# Patient Record
Sex: MALE | Race: BLACK OR AFRICAN AMERICAN | NOT HISPANIC OR LATINO | URBAN - METROPOLITAN AREA
[De-identification: names, ages, dates, MRNs, and addresses within clinical notes are randomized per-mention and may not be internally consistent; named-entity substitution may affect disease eponyms.]

---

## 2020-04-06 ENCOUNTER — INPATIENT (INPATIENT)
Facility: HOSPITAL | Age: 45
LOS: 7 days | Discharge: HOME | End: 2020-04-14
Attending: INTERNAL MEDICINE
Payer: COMMERCIAL

## 2020-04-06 VITALS
DIASTOLIC BLOOD PRESSURE: 92 MMHG | OXYGEN SATURATION: 93 % | TEMPERATURE: 100 F | RESPIRATION RATE: 24 BRPM | HEART RATE: 110 BPM | SYSTOLIC BLOOD PRESSURE: 134 MMHG

## 2020-04-06 DIAGNOSIS — B97.21 SARS-ASSOCIATED CORONAVIRUS AS THE CAUSE OF DISEASES CLASSIFIED ELSEWHERE: ICD-10-CM

## 2020-04-06 DIAGNOSIS — Z29.9 ENCOUNTER FOR PROPHYLACTIC MEASURES, UNSPECIFIED: ICD-10-CM

## 2020-04-06 DIAGNOSIS — R09.02 HYPOXEMIA: ICD-10-CM

## 2020-04-06 DIAGNOSIS — I10 ESSENTIAL (PRIMARY) HYPERTENSION: ICD-10-CM

## 2020-04-06 DIAGNOSIS — E87.6 HYPOKALEMIA: ICD-10-CM

## 2020-04-06 LAB
ALBUMIN SERPL ELPH-MCNC: 3.6 G/DL — SIGNIFICANT CHANGE UP (ref 3.3–5)
ALP SERPL-CCNC: 53 U/L — SIGNIFICANT CHANGE UP (ref 40–120)
ALT FLD-CCNC: 53 U/L — HIGH (ref 4–41)
ANION GAP SERPL CALC-SCNC: 14 MMO/L — SIGNIFICANT CHANGE UP (ref 7–14)
APTT BLD: 30.9 SEC — SIGNIFICANT CHANGE UP (ref 27.5–36.3)
AST SERPL-CCNC: 78 U/L — HIGH (ref 4–40)
BASE EXCESS BLDV CALC-SCNC: 3.1 MMOL/L — SIGNIFICANT CHANGE UP
BASOPHILS # BLD AUTO: 0.02 K/UL — SIGNIFICANT CHANGE UP (ref 0–0.2)
BASOPHILS NFR BLD AUTO: 0.3 % — SIGNIFICANT CHANGE UP (ref 0–2)
BILIRUB SERPL-MCNC: 0.5 MG/DL — SIGNIFICANT CHANGE UP (ref 0.2–1.2)
BLOOD GAS VENOUS - CREATININE: 1.29 MG/DL — SIGNIFICANT CHANGE UP (ref 0.5–1.3)
BLOOD GAS VENOUS - FIO2: 100 — SIGNIFICANT CHANGE UP
BUN SERPL-MCNC: 13 MG/DL — SIGNIFICANT CHANGE UP (ref 7–23)
CALCIUM SERPL-MCNC: 8.8 MG/DL — SIGNIFICANT CHANGE UP (ref 8.4–10.5)
CHLORIDE BLDV-SCNC: 102 MMOL/L — SIGNIFICANT CHANGE UP (ref 96–108)
CHLORIDE SERPL-SCNC: 96 MMOL/L — LOW (ref 98–107)
CO2 SERPL-SCNC: 24 MMOL/L — SIGNIFICANT CHANGE UP (ref 22–31)
CREAT SERPL-MCNC: 1.3 MG/DL — SIGNIFICANT CHANGE UP (ref 0.5–1.3)
CRP SERPL-MCNC: 70.5 MG/L — HIGH
D DIMER BLD IA.RAPID-MCNC: 492 NG/ML — SIGNIFICANT CHANGE UP
EOSINOPHIL # BLD AUTO: 0 K/UL — SIGNIFICANT CHANGE UP (ref 0–0.5)
EOSINOPHIL NFR BLD AUTO: 0 % — SIGNIFICANT CHANGE UP (ref 0–6)
FERRITIN SERPL-MCNC: 510.4 NG/ML — HIGH (ref 30–400)
GAS PNL BLDV: 132 MMOL/L — LOW (ref 136–146)
GLUCOSE BLDV-MCNC: 126 MG/DL — HIGH (ref 70–99)
GLUCOSE SERPL-MCNC: 127 MG/DL — HIGH (ref 70–99)
HCO3 BLDV-SCNC: 27 MMOL/L — SIGNIFICANT CHANGE UP (ref 20–27)
HCT VFR BLD CALC: 39.5 % — SIGNIFICANT CHANGE UP (ref 39–50)
HCT VFR BLDV CALC: 43 % — SIGNIFICANT CHANGE UP (ref 39–51)
HGB BLD-MCNC: 13.4 G/DL — SIGNIFICANT CHANGE UP (ref 13–17)
HGB BLDV-MCNC: 14 G/DL — SIGNIFICANT CHANGE UP (ref 13–17)
IMM GRANULOCYTES NFR BLD AUTO: 0.6 % — SIGNIFICANT CHANGE UP (ref 0–1.5)
INR BLD: 1.29 — HIGH (ref 0.88–1.17)
LACTATE BLDV-MCNC: 1.2 MMOL/L — SIGNIFICANT CHANGE UP (ref 0.5–2)
LDH SERPL L TO P-CCNC: 542 U/L — HIGH (ref 135–225)
LYMPHOCYTES # BLD AUTO: 0.91 K/UL — LOW (ref 1–3.3)
LYMPHOCYTES # BLD AUTO: 11.6 % — LOW (ref 13–44)
MCHC RBC-ENTMCNC: 29.3 PG — SIGNIFICANT CHANGE UP (ref 27–34)
MCHC RBC-ENTMCNC: 33.9 % — SIGNIFICANT CHANGE UP (ref 32–36)
MCV RBC AUTO: 86.2 FL — SIGNIFICANT CHANGE UP (ref 80–100)
MONOCYTES # BLD AUTO: 0.44 K/UL — SIGNIFICANT CHANGE UP (ref 0–0.9)
MONOCYTES NFR BLD AUTO: 5.6 % — SIGNIFICANT CHANGE UP (ref 2–14)
NEUTROPHILS # BLD AUTO: 6.43 K/UL — SIGNIFICANT CHANGE UP (ref 1.8–7.4)
NEUTROPHILS NFR BLD AUTO: 81.9 % — HIGH (ref 43–77)
NRBC # FLD: 0 K/UL — SIGNIFICANT CHANGE UP (ref 0–0)
PCO2 BLDV: 39 MMHG — LOW (ref 41–51)
PH BLDV: 7.46 PH — HIGH (ref 7.32–7.43)
PLATELET # BLD AUTO: 257 K/UL — SIGNIFICANT CHANGE UP (ref 150–400)
PMV BLD: 9.1 FL — SIGNIFICANT CHANGE UP (ref 7–13)
PO2 BLDV: 50 MMHG — HIGH (ref 35–40)
POTASSIUM BLDV-SCNC: 2.9 MMOL/L — CRITICAL LOW (ref 3.4–4.5)
POTASSIUM SERPL-MCNC: 3.1 MMOL/L — LOW (ref 3.5–5.3)
POTASSIUM SERPL-SCNC: 3.1 MMOL/L — LOW (ref 3.5–5.3)
PROCALCITONIN SERPL-MCNC: 0.09 NG/ML — SIGNIFICANT CHANGE UP (ref 0.02–0.1)
PROT SERPL-MCNC: 7.2 G/DL — SIGNIFICANT CHANGE UP (ref 6–8.3)
PROTHROM AB SERPL-ACNC: 15 SEC — HIGH (ref 9.8–13.1)
RBC # BLD: 4.58 M/UL — SIGNIFICANT CHANGE UP (ref 4.2–5.8)
RBC # FLD: 13.3 % — SIGNIFICANT CHANGE UP (ref 10.3–14.5)
SAO2 % BLDV: 84.2 % — SIGNIFICANT CHANGE UP (ref 60–85)
SODIUM SERPL-SCNC: 134 MMOL/L — LOW (ref 135–145)
WBC # BLD: 7.85 K/UL — SIGNIFICANT CHANGE UP (ref 3.8–10.5)
WBC # FLD AUTO: 7.85 K/UL — SIGNIFICANT CHANGE UP (ref 3.8–10.5)

## 2020-04-06 PROCEDURE — 71045 X-RAY EXAM CHEST 1 VIEW: CPT | Mod: 26

## 2020-04-06 PROCEDURE — 99233 SBSQ HOSP IP/OBS HIGH 50: CPT

## 2020-04-06 RX ORDER — ACETAMINOPHEN 500 MG
650 TABLET ORAL EVERY 4 HOURS
Refills: 0 | Status: DISCONTINUED | OUTPATIENT
Start: 2020-04-06 | End: 2020-04-14

## 2020-04-06 RX ORDER — HYDROXYCHLOROQUINE SULFATE 200 MG
TABLET ORAL
Refills: 0 | Status: COMPLETED | OUTPATIENT
Start: 2020-04-06 | End: 2020-04-11

## 2020-04-06 RX ORDER — AMLODIPINE BESYLATE 2.5 MG/1
1 TABLET ORAL
Qty: 0 | Refills: 0 | DISCHARGE

## 2020-04-06 RX ORDER — HYDROXYCHLOROQUINE SULFATE 200 MG
200 TABLET ORAL EVERY 12 HOURS
Refills: 0 | Status: COMPLETED | OUTPATIENT
Start: 2020-04-07 | End: 2020-04-11

## 2020-04-06 RX ORDER — POTASSIUM CHLORIDE 20 MEQ
40 PACKET (EA) ORAL EVERY 4 HOURS
Refills: 0 | Status: COMPLETED | OUTPATIENT
Start: 2020-04-06 | End: 2020-04-07

## 2020-04-06 RX ORDER — POTASSIUM CHLORIDE 20 MEQ
10 PACKET (EA) ORAL
Refills: 0 | Status: COMPLETED | OUTPATIENT
Start: 2020-04-06 | End: 2020-04-06

## 2020-04-06 RX ORDER — HYDROXYCHLOROQUINE SULFATE 200 MG
400 TABLET ORAL EVERY 12 HOURS
Refills: 0 | Status: COMPLETED | OUTPATIENT
Start: 2020-04-06 | End: 2020-04-07

## 2020-04-06 RX ORDER — ENOXAPARIN SODIUM 100 MG/ML
40 INJECTION SUBCUTANEOUS DAILY
Refills: 0 | Status: DISCONTINUED | OUTPATIENT
Start: 2020-04-06 | End: 2020-04-09

## 2020-04-06 RX ORDER — AMLODIPINE BESYLATE 2.5 MG/1
10 TABLET ORAL DAILY
Refills: 0 | Status: DISCONTINUED | OUTPATIENT
Start: 2020-04-07 | End: 2020-04-14

## 2020-04-06 RX ADMIN — Medication 100 MILLIEQUIVALENT(S): at 17:30

## 2020-04-06 RX ADMIN — Medication 100 MILLIEQUIVALENT(S): at 19:22

## 2020-04-06 RX ADMIN — Medication 40 MILLIGRAM(S): at 17:28

## 2020-04-06 RX ADMIN — Medication 400 MILLIGRAM(S): at 21:30

## 2020-04-06 RX ADMIN — ENOXAPARIN SODIUM 40 MILLIGRAM(S): 100 INJECTION SUBCUTANEOUS at 17:28

## 2020-04-06 RX ADMIN — Medication 40 MILLIEQUIVALENT(S): at 17:28

## 2020-04-06 RX ADMIN — Medication 650 MILLIGRAM(S): at 17:30

## 2020-04-06 RX ADMIN — Medication 40 MILLIEQUIVALENT(S): at 21:30

## 2020-04-06 NOTE — H&P ADULT - HISTORY OF PRESENT ILLNESS
Per current hospital medicine emergency protocol, in an effort to reduce COVID exposures and also conserve PPE for necessary encounters, H&P below is obtained from chart review without direct patient contact unless acute changes develop.    HPI:      PAST MEDICAL & SURGICAL HISTORY:      Review of Systems:   CONSTITUTIONAL: No fever, weight loss, or fatigue  EYES: No eye pain, visual disturbances, or discharge  ENMT:  No difficulty hearing, tinnitus, vertigo; No sinus or throat pain  NECK: No pain or stiffness  BREASTS: No pain, masses, or nipple discharge  RESPIRATORY: No cough, wheezing, chills or hemoptysis; No shortness of breath  CARDIOVASCULAR: No chest pain, palpitations, dizziness, or leg swelling  GASTROINTESTINAL: No abdominal or epigastric pain. No nausea, vomiting, or hematemesis; No diarrhea or constipation. No melena or hematochezia.  GENITOURINARY: No dysuria, frequency, hematuria, or incontinence  NEUROLOGICAL: No headaches, memory loss, loss of strength, numbness, or tremors  SKIN: No itching, burning, rashes, or lesions   LYMPH NODES: No enlarged glands  ENDOCRINE: No heat or cold intolerance; No hair loss  MUSCULOSKELETAL: No joint pain or swelling; No muscle, back, or extremity pain  PSYCHIATRIC: No depression, anxiety, mood swings, or difficulty sleeping  HEME/LYMPH: No easy bruising, or bleeding gums  ALLERGY AND IMMUNOLOGIC: No hives or eczema    Allergies    No Known Allergies    Intolerances        Social History:     FAMILY HISTORY:      MEDICATIONS  (STANDING):  enoxaparin Injectable 40 milliGRAM(s) SubCutaneous daily  methylPREDNISolone sodium succinate Injectable 40 milliGRAM(s) IV Push two times a day  potassium chloride    Tablet ER 40 milliEquivalent(s) Oral every 4 hours  potassium chloride  10 mEq/100 mL IVPB 10 milliEquivalent(s) IV Intermittent every 1 hour    MEDICATIONS  (PRN):  acetaminophen   Tablet .. 650 milliGRAM(s) Oral every 4 hours PRN Temp greater or equal to 38.5C (101.3F)  acetaminophen  Suppository .. 650 milliGRAM(s) Rectal every 4 hours PRN Temp greater or equal to 38.5C (101.3F)      T(C): 37.8 (04-06-20 @ 16:41), Max: 37.8 (04-06-20 @ 12:24)  HR: 100 (04-06-20 @ 16:41) (100 - 110)  BP: 168/92 (04-06-20 @ 16:41) (112/86 - 168/92)  RR: 32 (04-06-20 @ 16:42) (24 - 45)  SpO2: 92% (04-06-20 @ 16:42) (75% - 93%)    CAPILLARY BLOOD GLUCOSE        I&O's Summary      PHYSICAL EXAM:  GENERAL: NAD, well-developed  HEAD:  Atraumatic, Normocephalic  EYES: EOMI, PERRLA, conjunctiva and sclera clear  NECK: Supple, No elevated JVD  CHEST/LUNG: Clear to auscultation bilaterally; No wheeze  HEART: Regular rate and rhythm; No murmurs, rubs, or gallops  ABDOMEN: Soft, Nontender, Nondistended; Bowel sounds present  EXTREMITIES:  2+ Peripheral Pulses, No clubbing, cyanosis, or edema  PSYCH: AAOx3  NEUROLOGY: CN II-XII grossly intact, moving all extremities  SKIN: No rashes or lesions    LABS:                        13.4   7.85  )-----------( 257      ( 06 Apr 2020 12:50 )             39.5     04-06    134<L>  |  96<L>  |  13  ----------------------------<  127<H>  3.1<L>   |  24  |  1.30    Ca    8.8      06 Apr 2020 12:50    TPro  7.2  /  Alb  3.6  /  TBili  0.5  /  DBili  x   /  AST  78<H>  /  ALT  53<H>  /  AlkPhos  53  04-06    PT/INR - ( 06 Apr 2020 12:50 )   PT: 15.0 SEC;   INR: 1.29          PTT - ( 06 Apr 2020 12:50 )  PTT:30.9 SEC            RADIOLOGY & ADDITIONAL TESTS:    ECG Personally Reviewed -     Imaging Personally Reviewed:    Consultant(s) Notes Reviewed:      Care Discussed with Consultants/Other Providers: Per current hospital medicine emergency protocol, in an effort to reduce COVID exposures and also conserve PPE for necessary encounters, H&P below is obtained from chart review without direct patient contact unless acute changes develop.    HPI:  44M PMH HTN (on Amlodipine) p/w weakness, intermittent fevers, night sweats since last Wednesday. States his weakness got worse last night and felt shortness of breath this morning and went to  and was told based on his oxygen saturation to come to the ED. Reports night sweats and fevers getting better in the last few days but his weakness persisting. Denies any headaches, cough, chest pain, abd pain, n/v/d, urinary sxs, leg swelling. Denies recent surgeries, hx blood clots. States he works at [employer]. Was given tylenol an hour prior to arrival at .      PAST MEDICAL & SURGICAL HISTORY:      Review of Systems: per ED        Allergies    No Known Allergies    Intolerances        Social History:     FAMILY HISTORY:      MEDICATIONS  (STANDING):  enoxaparin Injectable 40 milliGRAM(s) SubCutaneous daily  methylPREDNISolone sodium succinate Injectable 40 milliGRAM(s) IV Push two times a day  potassium chloride    Tablet ER 40 milliEquivalent(s) Oral every 4 hours  potassium chloride  10 mEq/100 mL IVPB 10 milliEquivalent(s) IV Intermittent every 1 hour    MEDICATIONS  (PRN):  acetaminophen   Tablet .. 650 milliGRAM(s) Oral every 4 hours PRN Temp greater or equal to 38.5C (101.3F)  acetaminophen  Suppository .. 650 milliGRAM(s) Rectal every 4 hours PRN Temp greater or equal to 38.5C (101.3F)      T(C): 37.8 (04-06-20 @ 16:41), Max: 37.8 (04-06-20 @ 12:24)  HR: 100 (04-06-20 @ 16:41) (100 - 110)  BP: 168/92 (04-06-20 @ 16:41) (112/86 - 168/92)  RR: 32 (04-06-20 @ 16:42) (24 - 45)  SpO2: 92% (04-06-20 @ 16:42) (75% - 93%)    CAPILLARY BLOOD GLUCOSE        I&O's Summary      PHYSICAL EXAM:  GENERAL: NAD, well-developed  HEAD:  Atraumatic, Normocephalic  EYES: EOMI, PERRLA, conjunctiva and sclera clear  NECK: Supple, No elevated JVD  CHEST/LUNG: Clear to auscultation bilaterally; No wheeze  HEART: Regular rate and rhythm; No murmurs, rubs, or gallops  ABDOMEN: Soft, Nontender, Nondistended; Bowel sounds present  EXTREMITIES:  2+ Peripheral Pulses, No clubbing, cyanosis, or edema  PSYCH: AAOx3  NEUROLOGY: CN II-XII grossly intact, moving all extremities  SKIN: No rashes or lesions    LABS:                        13.4   7.85  )-----------( 257      ( 06 Apr 2020 12:50 )             39.5     04-06    134<L>  |  96<L>  |  13  ----------------------------<  127<H>  3.1<L>   |  24  |  1.30    Ca    8.8      06 Apr 2020 12:50    TPro  7.2  /  Alb  3.6  /  TBili  0.5  /  DBili  x   /  AST  78<H>  /  ALT  53<H>  /  AlkPhos  53  04-06    PT/INR - ( 06 Apr 2020 12:50 )   PT: 15.0 SEC;   INR: 1.29          PTT - ( 06 Apr 2020 12:50 )  PTT:30.9 SEC            RADIOLOGY & ADDITIONAL TESTS:    ECG Personally Reviewed -     Imaging Personally Reviewed:    Consultant(s) Notes Reviewed:      Care Discussed with Consultants/Other Providers: Per current hospital medicine emergency protocol, in an effort to reduce COVID exposures and also conserve PPE for necessary encounters, H&P below is obtained from chart review without direct patient contact unless acute changes develop.    HPI:  44M PMH HTN (on Amlodipine) p/w weakness, intermittent fevers, night sweats since last Wednesday. States his weakness got worse last night and felt shortness of breath this morning and went to  and was told based on his oxygen saturation to come to the ED. Reports night sweats and fevers getting better in the last few days but his weakness persisting. Denies any headaches, cough, chest pain, abd pain, n/v/d, urinary sxs, leg swelling. Denies recent surgeries, hx blood clots. States he works at [employer]. Was given tylenol an hour prior to arrival at .      PAST MEDICAL & SURGICAL HISTORY:      Review of Systems: per ED      Allergies    No Known Allergies    Intolerances        Social History:     FAMILY HISTORY:      MEDICATIONS  (STANDING):  enoxaparin Injectable 40 milliGRAM(s) SubCutaneous daily  methylPREDNISolone sodium succinate Injectable 40 milliGRAM(s) IV Push two times a day  potassium chloride    Tablet ER 40 milliEquivalent(s) Oral every 4 hours  potassium chloride  10 mEq/100 mL IVPB 10 milliEquivalent(s) IV Intermittent every 1 hour    MEDICATIONS  (PRN):  acetaminophen   Tablet .. 650 milliGRAM(s) Oral every 4 hours PRN Temp greater or equal to 38.5C (101.3F)  acetaminophen  Suppository .. 650 milliGRAM(s) Rectal every 4 hours PRN Temp greater or equal to 38.5C (101.3F)      T(C): 37.8 (04-06-20 @ 16:41), Max: 37.8 (04-06-20 @ 12:24)  HR: 100 (04-06-20 @ 16:41) (100 - 110)  BP: 168/92 (04-06-20 @ 16:41) (112/86 - 168/92)  RR: 32 (04-06-20 @ 16:42) (24 - 45)  SpO2: 92% (04-06-20 @ 16:42) (75% - 93%)    CAPILLARY BLOOD GLUCOSE        I&O's Summary      PHYSICAL EXAM:  GENERAL: NAD, well-developed  HEAD:  Atraumatic, Normocephalic  EYES: EOMI, PERRLA, conjunctiva and sclera clear  NECK: Supple, No elevated JVD  CHEST/LUNG: Clear to auscultation bilaterally; No wheeze  HEART: Regular rate and rhythm; No murmurs, rubs, or gallops  ABDOMEN: Soft, Nontender, Nondistended; Bowel sounds present  EXTREMITIES:  2+ Peripheral Pulses, No clubbing, cyanosis, or edema  PSYCH: AAOx3  NEUROLOGY: CN II-XII grossly intact, moving all extremities  SKIN: No rashes or lesions    LABS:                        13.4   7.85  )-----------( 257      ( 06 Apr 2020 12:50 )             39.5     04-06    134<L>  |  96<L>  |  13  ----------------------------<  127<H>  3.1<L>   |  24  |  1.30    Ca    8.8      06 Apr 2020 12:50    TPro  7.2  /  Alb  3.6  /  TBili  0.5  /  DBili  x   /  AST  78<H>  /  ALT  53<H>  /  AlkPhos  53  04-06    PT/INR - ( 06 Apr 2020 12:50 )   PT: 15.0 SEC;   INR: 1.29          PTT - ( 06 Apr 2020 12:50 )  PTT:30.9 SEC            RADIOLOGY & ADDITIONAL TESTS:    ECG Personally Reviewed -     Imaging Personally Reviewed:    Consultant(s) Notes Reviewed:      Care Discussed with Consultants/Other Providers: Per current hospital medicine emergency protocol, in an effort to reduce COVID exposures and also conserve PPE for necessary encounters, H&P below is obtained from chart review without direct patient contact unless acute changes develop.    HPI:  44M PMH HTN (on Amlodipine) p/w weakness, intermittent fevers, night sweats since last Wednesday. States his weakness got worse last night and felt shortness of breath this morning and went to  and was told based on his oxygen saturation to come to the ED. Reports night sweats and fevers getting better in the last few days but his weakness persisting. Denies any headaches, cough, chest pain, abd pain, n/v/d, urinary sxs, leg swelling. Denies recent surgeries, hx blood clots. States he works at [employer]. Was given tylenol an hour prior to arrival at .      PAST MEDICAL & SURGICAL HISTORY:      Review of Systems: per ED  negative except as in HPI    Allergies    No Known Allergies    Intolerances        Social History:     FAMILY HISTORY:      MEDICATIONS  (STANDING):  enoxaparin Injectable 40 milliGRAM(s) SubCutaneous daily  methylPREDNISolone sodium succinate Injectable 40 milliGRAM(s) IV Push two times a day  potassium chloride    Tablet ER 40 milliEquivalent(s) Oral every 4 hours  potassium chloride  10 mEq/100 mL IVPB 10 milliEquivalent(s) IV Intermittent every 1 hour    MEDICATIONS  (PRN):  acetaminophen   Tablet .. 650 milliGRAM(s) Oral every 4 hours PRN Temp greater or equal to 38.5C (101.3F)  acetaminophen  Suppository .. 650 milliGRAM(s) Rectal every 4 hours PRN Temp greater or equal to 38.5C (101.3F)      T(C): 37.8 (04-06-20 @ 16:41), Max: 37.8 (04-06-20 @ 12:24)  HR: 100 (04-06-20 @ 16:41) (100 - 110)  BP: 168/92 (04-06-20 @ 16:41) (112/86 - 168/92)  RR: 32 (04-06-20 @ 16:42) (24 - 45)  SpO2: 92% (04-06-20 @ 16:42) (75% - 93%)    CAPILLARY BLOOD GLUCOSE        I&O's Summary      PHYSICAL EXAM: per ED     GENERAL: appears to be in some resp distress  CARDIAC: tachy rate and regular rhythm, normal S1 and S2, no appreciable murmurs  PULM: clear to ascultation bilaterally, no appreciable crackles, rales, rhonchi, or wheezing, tachypnic and saturating 93% on 10L NRB, some conversational dyspnea  GI: abdomen nondistended, soft, nontender  NEURO: alert and oriented x 3, moving all extremities  MSK: no visible deformities, no peripheral edema, calf tenderness/redness/swelling  SKIN: no visible rashes, dry, well-perfused PSYCH: appropriate mood and affect	        LABS:                        13.4   7.85  )-----------( 257      ( 06 Apr 2020 12:50 )             39.5     04-06    134<L>  |  96<L>  |  13  ----------------------------<  127<H>  3.1<L>   |  24  |  1.30    Ca    8.8      06 Apr 2020 12:50    TPro  7.2  /  Alb  3.6  /  TBili  0.5  /  DBili  x   /  AST  78<H>  /  ALT  53<H>  /  AlkPhos  53  04-06    PT/INR - ( 06 Apr 2020 12:50 )   PT: 15.0 SEC;   INR: 1.29          PTT - ( 06 Apr 2020 12:50 )  PTT:30.9 SEC            RADIOLOGY & ADDITIONAL TESTS:    ECG Personally Reviewed -     Imaging Personally Reviewed:    Consultant(s) Notes Reviewed:      Care Discussed with Consultants/Other Providers: Per current hospital medicine emergency protocol, in an effort to reduce COVID exposures and also conserve PPE for necessary encounters, H&P below is obtained from chart review without direct patient contact unless acute changes develop.    HPI:    44M PMH HTN (on Amlodipine) p/w weakness, intermittent fevers, night sweats since last Wednesday. States his weakness got worse last night and felt shortness of breath this morning and went to  and was told based on his oxygen saturation to come to the ED. Reports night sweats and fevers getting better in the last few days but his weakness persisting. Denies any headaches, cough, chest pain, abd pain, n/v/d, urinary sxs, leg swelling. Denies recent surgeries, hx blood clots. States he works at [employer] Was given tylenol an hour prior to arrival at .      PAST MEDICAL & SURGICAL HISTORY:  Essential hypertension      Review of Systems: per ED  negative except as in HPI    Allergies    No Known Allergies    Intolerances        FAMILY HISTORY:  No pertinent medical history in first degree relatives.    MEDICATIONS  (STANDING):  enoxaparin Injectable 40 milliGRAM(s) SubCutaneous daily  methylPREDNISolone sodium succinate Injectable 40 milliGRAM(s) IV Push two times a day  potassium chloride    Tablet ER 40 milliEquivalent(s) Oral every 4 hours  potassium chloride  10 mEq/100 mL IVPB 10 milliEquivalent(s) IV Intermittent every 1 hour    MEDICATIONS  (PRN):  acetaminophen   Tablet .. 650 milliGRAM(s) Oral every 4 hours PRN Temp greater or equal to 38.5C (101.3F)  acetaminophen  Suppository .. 650 milliGRAM(s) Rectal every 4 hours PRN Temp greater or equal to 38.5C (101.3F)      T(C): 37.8 (04-06-20 @ 16:41), Max: 37.8 (04-06-20 @ 12:24)  HR: 100 (04-06-20 @ 16:41) (100 - 110)  BP: 168/92 (04-06-20 @ 16:41) (112/86 - 168/92)  RR: 32 (04-06-20 @ 16:42) (24 - 45)  SpO2: 92% (04-06-20 @ 16:42) (75% - 93%)    CAPILLARY BLOOD GLUCOSE        I&O's Summary      PHYSICAL EXAM: per ED     GENERAL: appears to be in some resp distress  CARDIAC: tachy rate and regular rhythm, normal S1 and S2, no appreciable murmurs  PULM: clear to ascultation bilaterally, no appreciable crackles, rales, rhonchi, or wheezing, tachypnic and saturating 93% on 10L NRB, some conversational dyspnea  GI: abdomen nondistended, soft, nontender  NEURO: alert and oriented x 3, moving all extremities  MSK: no visible deformities, no peripheral edema, calf tenderness/redness/swelling  SKIN: no visible rashes, dry, well-perfused PSYCH: appropriate mood and affect	        LABS:                        13.4   7.85  )-----------( 257      ( 06 Apr 2020 12:50 )             39.5     04-06    134<L>  |  96<L>  |  13  ----------------------------<  127<H>  3.1<L>   |  24  |  1.30    Ca    8.8      06 Apr 2020 12:50    TPro  7.2  /  Alb  3.6  /  TBili  0.5  /  DBili  x   /  AST  78<H>  /  ALT  53<H>  /  AlkPhos  53  04-06    PT/INR - ( 06 Apr 2020 12:50 )   PT: 15.0 SEC;   INR: 1.29          PTT - ( 06 Apr 2020 12:50 )  PTT:30.9 SEC            RADIOLOGY & ADDITIONAL TESTS:    ECG Personally Reviewed -     Imaging Personally Reviewed: CXR - Multifocal indistinct patchy opacities predominantly in periphery of both mid to lower lungs compatible with multifocal infection/pneumonia including atypical viral etiologies    Consultant(s) Notes Reviewed:      Care Discussed with Consultants/Other Providers:

## 2020-04-06 NOTE — ED PROVIDER NOTE - OBJECTIVE STATEMENT
44M PMH HTN (on Amlodipine) p/w weakness, intermittent fevers, night sweats since last Wednesday. States his weakness got worse last night and felt shortness of breath this morning and went to  and was told based on his oxygen saturation to come to the ED. Reports night sweats and fevers getting better in the last few days but his weakness persisting. Denies any headaches, cough, chest pain, abd pain, n/v/d, urinary sxs, leg swelling. Denies recent surgeries, hx blood clots. States he works at [employer]. Was given tylenol an hour prior to arrival at .

## 2020-04-06 NOTE — ED ADULT TRIAGE NOTE - CHIEF COMPLAINT QUOTE
Sent from urgent care c/o sob, weakness, nausea and fever x 1wk. SpO2 70s on RA, placed on 8L NRB. Denies vomiting, diarrhea. h/o HTN

## 2020-04-06 NOTE — ED PROVIDER NOTE - CLINICAL SUMMARY MEDICAL DECISION MAKING FREE TEXT BOX
44M PMH HTN p/w fevers, night sweats, weakness and sob, sxs began last Wednesday and have gotten worse last night, went to  was hypoxic with mild temp and sent to ED. Given tylenol hour prior to arrival. Vitals show 93% on 10L NRB and tachypnic, some conversational dyspnea, no focal lung exam findings. Likely Covid given clinical picture. Low suspicion for bact pna/ pe. Will do labs and CXR and admit for continued monitoring.

## 2020-04-06 NOTE — H&P ADULT - PROBLEM SELECTOR PLAN 1
-Respiratory distress, hypoxia requiring supplemental O2, and CXR findings concerning for COVID19, pending results  - Continue supplemental O2, titrate up to keep sats =>92%  - Hydroxychloroquine 400 mg PO BIDx1 day, then 200 mg PO BIDx4 days -Respiratory distress, hypoxia requiring supplemental O2, and CXR findings concerning for COVID19, pending results  - Continue supplemental O2, titrate up to keep sats =>92%  - Solumedrol 40 mg BID  - If COVID+ can start Hydroxychloroquine. Needs baseline EKG for QTC.  - Tylenol prn -Respiratory distress, hypoxia requiring supplemental O2, and CXR findings concerning for COVID19, pending results; on NRB  - Continue supplemental O2, titrate up to keep sats =>92%  - Solumedrol 40 mg BID  - Start Hydroxychloroquine. Needs baseline EKG for QTC.  - Tylenol prn

## 2020-04-06 NOTE — ED PROVIDER NOTE - PHYSICAL EXAMINATION
GENERAL: appears to be in some resp distress  CARDIAC: tachy rate and regular rhythm, normal S1 and S2, no appreciable murmurs  PULM: clear to ascultation bilaterally, no appreciable crackles, rales, rhonchi, or wheezing, tachypnic and saturating 93% on 10L NRB, some conversational dyspnea  GI: abdomen nondistended, soft, nontender  NEURO: alert and oriented x 3, moving all extremities  MSK: no visible deformities, no peripheral edema, calf tenderness/redness/swelling  SKIN: no visible rashes, dry, well-perfused  PSYCH: appropriate mood and affect

## 2020-04-06 NOTE — ED ADULT NURSE NOTE - OBJECTIVE STATEMENT
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 patient reports lethargy  and fever x 4-5 days with extreeme weakness that began today. pt denies SOB however went to urgent care and was found to be hypoxic on room air with a saturation of 75. upon presentation to the ED pt is tachynepnic and having difficulty speaking full sentences. placed on NRB 10 LPM with imrovement in saturation. IV 20 g L hand labs sent TQ removed.

## 2020-04-06 NOTE — H&P ADULT - ASSESSMENT
45 yo M with history of HTN admitted for 45 yo M with history of HTN admitted for respiratory distress and hypoxia with CXR findings concerning for PNA, concerning for COVID19.

## 2020-04-07 LAB
ALBUMIN SERPL ELPH-MCNC: 3.7 G/DL — SIGNIFICANT CHANGE UP (ref 3.3–5)
ALP SERPL-CCNC: 59 U/L — SIGNIFICANT CHANGE UP (ref 40–120)
ALT FLD-CCNC: 56 U/L — HIGH (ref 4–41)
ANION GAP SERPL CALC-SCNC: 11 MMO/L — SIGNIFICANT CHANGE UP (ref 7–14)
APTT BLD: 30 SEC — SIGNIFICANT CHANGE UP (ref 27.5–36.3)
AST SERPL-CCNC: 74 U/L — HIGH (ref 4–40)
BASOPHILS # BLD AUTO: 0.01 K/UL — SIGNIFICANT CHANGE UP (ref 0–0.2)
BASOPHILS NFR BLD AUTO: 0.1 % — SIGNIFICANT CHANGE UP (ref 0–2)
BASOPHILS NFR SPEC: 0 % — SIGNIFICANT CHANGE UP (ref 0–2)
BILIRUB SERPL-MCNC: 0.4 MG/DL — SIGNIFICANT CHANGE UP (ref 0.2–1.2)
BLASTS # FLD: 0 % — SIGNIFICANT CHANGE UP (ref 0–0)
BUN SERPL-MCNC: 14 MG/DL — SIGNIFICANT CHANGE UP (ref 7–23)
CALCIUM SERPL-MCNC: 9.1 MG/DL — SIGNIFICANT CHANGE UP (ref 8.4–10.5)
CHLORIDE SERPL-SCNC: 99 MMOL/L — SIGNIFICANT CHANGE UP (ref 98–107)
CO2 SERPL-SCNC: 24 MMOL/L — SIGNIFICANT CHANGE UP (ref 22–31)
CREAT SERPL-MCNC: 1.02 MG/DL — SIGNIFICANT CHANGE UP (ref 0.5–1.3)
D DIMER BLD IA.RAPID-MCNC: 589 NG/ML — SIGNIFICANT CHANGE UP
EOSINOPHIL # BLD AUTO: 0 K/UL — SIGNIFICANT CHANGE UP (ref 0–0.5)
EOSINOPHIL NFR BLD AUTO: 0 % — SIGNIFICANT CHANGE UP (ref 0–6)
EOSINOPHIL NFR FLD: 0 % — SIGNIFICANT CHANGE UP (ref 0–6)
GIANT PLATELETS BLD QL SMEAR: PRESENT — SIGNIFICANT CHANGE UP
GLUCOSE SERPL-MCNC: 125 MG/DL — HIGH (ref 70–99)
HCT VFR BLD CALC: 43.5 % — SIGNIFICANT CHANGE UP (ref 39–50)
HGB BLD-MCNC: 14.7 G/DL — SIGNIFICANT CHANGE UP (ref 13–17)
IMM GRANULOCYTES NFR BLD AUTO: 0.6 % — SIGNIFICANT CHANGE UP (ref 0–1.5)
INR BLD: 1.14 — SIGNIFICANT CHANGE UP (ref 0.88–1.17)
LYMPHOCYTES # BLD AUTO: 0.58 K/UL — LOW (ref 1–3.3)
LYMPHOCYTES # BLD AUTO: 6.2 % — LOW (ref 13–44)
LYMPHOCYTES NFR SPEC AUTO: 1.7 % — LOW (ref 13–44)
MCHC RBC-ENTMCNC: 29.6 PG — SIGNIFICANT CHANGE UP (ref 27–34)
MCHC RBC-ENTMCNC: 33.8 % — SIGNIFICANT CHANGE UP (ref 32–36)
MCV RBC AUTO: 87.5 FL — SIGNIFICANT CHANGE UP (ref 80–100)
METAMYELOCYTES # FLD: 0 % — SIGNIFICANT CHANGE UP (ref 0–1)
MONOCYTES # BLD AUTO: 0.28 K/UL — SIGNIFICANT CHANGE UP (ref 0–0.9)
MONOCYTES NFR BLD AUTO: 3 % — SIGNIFICANT CHANGE UP (ref 2–14)
MONOCYTES NFR BLD: 1.7 % — LOW (ref 2–9)
MYELOCYTES NFR BLD: 0.9 % — HIGH (ref 0–0)
NEUTROPHIL AB SER-ACNC: 87 % — HIGH (ref 43–77)
NEUTROPHILS # BLD AUTO: 8.47 K/UL — HIGH (ref 1.8–7.4)
NEUTROPHILS NFR BLD AUTO: 90.1 % — HIGH (ref 43–77)
NEUTS BAND # BLD: 7.8 % — HIGH (ref 0–6)
NRBC # FLD: 0 K/UL — SIGNIFICANT CHANGE UP (ref 0–0)
OTHER - HEMATOLOGY %: 0 — SIGNIFICANT CHANGE UP
PLATELET # BLD AUTO: 342 K/UL — SIGNIFICANT CHANGE UP (ref 150–400)
PLATELET COUNT - ESTIMATE: NORMAL — SIGNIFICANT CHANGE UP
PMV BLD: 9.1 FL — SIGNIFICANT CHANGE UP (ref 7–13)
POTASSIUM SERPL-MCNC: 4.4 MMOL/L — SIGNIFICANT CHANGE UP (ref 3.5–5.3)
POTASSIUM SERPL-SCNC: 4.4 MMOL/L — SIGNIFICANT CHANGE UP (ref 3.5–5.3)
PROMYELOCYTES # FLD: 0 % — SIGNIFICANT CHANGE UP (ref 0–0)
PROT SERPL-MCNC: 7.5 G/DL — SIGNIFICANT CHANGE UP (ref 6–8.3)
PROTHROM AB SERPL-ACNC: 13.2 SEC — HIGH (ref 9.8–13.1)
RBC # BLD: 4.97 M/UL — SIGNIFICANT CHANGE UP (ref 4.2–5.8)
RBC # FLD: 13.3 % — SIGNIFICANT CHANGE UP (ref 10.3–14.5)
REVIEW TO FOLLOW: YES — SIGNIFICANT CHANGE UP
SARS-COV-2 RNA SPEC QL NAA+PROBE: DETECTED
SODIUM SERPL-SCNC: 134 MMOL/L — LOW (ref 135–145)
VARIANT LYMPHS # BLD: 0 % — SIGNIFICANT CHANGE UP
WBC # BLD: 9.4 K/UL — SIGNIFICANT CHANGE UP (ref 3.8–10.5)
WBC # FLD AUTO: 9.4 K/UL — SIGNIFICANT CHANGE UP (ref 3.8–10.5)

## 2020-04-07 PROCEDURE — 93010 ELECTROCARDIOGRAM REPORT: CPT

## 2020-04-07 RX ORDER — ALBUTEROL 90 UG/1
2 AEROSOL, METERED ORAL EVERY 6 HOURS
Refills: 0 | Status: DISCONTINUED | OUTPATIENT
Start: 2020-04-07 | End: 2020-04-14

## 2020-04-07 RX ADMIN — Medication 400 MILLIGRAM(S): at 06:19

## 2020-04-07 RX ADMIN — Medication 40 MILLIGRAM(S): at 06:30

## 2020-04-07 RX ADMIN — Medication 40 MILLIEQUIVALENT(S): at 01:28

## 2020-04-07 RX ADMIN — Medication 40 MILLIGRAM(S): at 06:19

## 2020-04-07 RX ADMIN — Medication 100 MILLIEQUIVALENT(S): at 00:28

## 2020-04-07 RX ADMIN — AMLODIPINE BESYLATE 10 MILLIGRAM(S): 2.5 TABLET ORAL at 07:39

## 2020-04-07 RX ADMIN — ENOXAPARIN SODIUM 40 MILLIGRAM(S): 100 INJECTION SUBCUTANEOUS at 16:29

## 2020-04-07 RX ADMIN — AMLODIPINE BESYLATE 10 MILLIGRAM(S): 2.5 TABLET ORAL at 06:19

## 2020-04-07 RX ADMIN — Medication 200 MILLIGRAM(S): at 16:28

## 2020-04-07 RX ADMIN — Medication 40 MILLIGRAM(S): at 16:28

## 2020-04-07 NOTE — PROGRESS NOTE ADULT - ASSESSMENT
44M w/ HTN p/w 1 week of fevers and weakness, found to be hypoxic in UC. COVID-19 +.    COVID-19 Test: +4/6  Oxygen Support: NRB  Prognostic labs: Ferritin 510; CRP 70  QTC: 429  Hydroxychloroquine: starting 4/6  Steroids: starting 4/6  [place]: n/a    Problem/Plan:  #Hypoxic respiratory failure 2/2 COVID-19 Infection  - HCQ ending 4/11  - steroids ending 4/9  - Tylenol PRN fever  - albuterol PRN SOB    #Chronic Medical Problems:  - HTN: c/w amlodipine    #FEN/PPx  - lovenox SubQ  - regular diet    #FULL CODE    Abdiel Diallo  Acting Medicine Hospitalist  (c) [phone]  (p) [phone]

## 2020-04-07 NOTE — PROGRESS NOTE ADULT - SUBJECTIVE AND OBJECTIVE BOX
Subjective/Interval Events: pt feels much better since admission. SOB improved on NRB, able to eat now.     Hospital Medications:  acetaminophen   Tablet .. 650 milliGRAM(s) Oral every 4 hours PRN  acetaminophen  Suppository .. 650 milliGRAM(s) Rectal every 4 hours PRN  ALBUTerol    90 MICROgram(s) HFA Inhaler 2 Puff(s) Inhalation every 6 hours PRN  amLODIPine   Tablet 10 milliGRAM(s) Oral daily  enoxaparin Injectable 40 milliGRAM(s) SubCutaneous daily  hydroxychloroquine 200 milliGRAM(s) Oral every 12 hours  hydroxychloroquine   Oral   methylPREDNISolone sodium succinate Injectable 40 milliGRAM(s) IV Push two times a day      Physical Exam:  T(C): 37 (04-07-20 @ 06:17), Max: 37.8 (04-06-20 @ 16:41)  HR: 96 (04-07-20 @ 06:17) (96 - 104)  BP: 133/84 (04-07-20 @ 06:17) (133/84 - 168/92)  RR: 19 (04-07-20 @ 06:17) (19 - 45)  SpO2: 93% (04-07-20 @ 06:17) (86% - 98%)    Comfortable appearing  On NRB  No respiratory distress    Labs: reviewed                        14.7   9.40  )-----------( 342      ( 07 Apr 2020 06:34 )             43.5     04-07    134<L>  |  99  |  14  ----------------------------<  125<H>  4.4   |  24  |  1.02    Ca    9.1      07 Apr 2020 06:34    TPro  7.5  /  Alb  3.7  /  TBili  0.4  /  DBili  x   /  AST  74<H>  /  ALT  56<H>  /  AlkPhos  59  04-07    LIVER FUNCTIONS - ( 07 Apr 2020 06:34 )  Alb: 3.7 g/dL / Pro: 7.5 g/dL / ALK PHOS: 59 u/L / ALT: 56 u/L / AST: 74 u/L / GGT: x               Diagnostic Studies: reviewed

## 2020-04-08 LAB
ANION GAP SERPL CALC-SCNC: 14 MMO/L — SIGNIFICANT CHANGE UP (ref 7–14)
BASOPHILS # BLD AUTO: 0.04 K/UL — SIGNIFICANT CHANGE UP (ref 0–0.2)
BASOPHILS NFR BLD AUTO: 0.2 % — SIGNIFICANT CHANGE UP (ref 0–2)
BUN SERPL-MCNC: 20 MG/DL — SIGNIFICANT CHANGE UP (ref 7–23)
CALCIUM SERPL-MCNC: 9 MG/DL — SIGNIFICANT CHANGE UP (ref 8.4–10.5)
CHLORIDE SERPL-SCNC: 98 MMOL/L — SIGNIFICANT CHANGE UP (ref 98–107)
CO2 SERPL-SCNC: 25 MMOL/L — SIGNIFICANT CHANGE UP (ref 22–31)
CREAT SERPL-MCNC: 1.18 MG/DL — SIGNIFICANT CHANGE UP (ref 0.5–1.3)
EOSINOPHIL # BLD AUTO: 0.02 K/UL — SIGNIFICANT CHANGE UP (ref 0–0.5)
EOSINOPHIL NFR BLD AUTO: 0.1 % — SIGNIFICANT CHANGE UP (ref 0–6)
GLUCOSE SERPL-MCNC: 115 MG/DL — HIGH (ref 70–99)
HCT VFR BLD CALC: 42.7 % — SIGNIFICANT CHANGE UP (ref 39–50)
HGB BLD-MCNC: 14.2 G/DL — SIGNIFICANT CHANGE UP (ref 13–17)
IMM GRANULOCYTES NFR BLD AUTO: 1.6 % — HIGH (ref 0–1.5)
LYMPHOCYTES # BLD AUTO: 0.96 K/UL — LOW (ref 1–3.3)
LYMPHOCYTES # BLD AUTO: 6 % — LOW (ref 13–44)
MAGNESIUM SERPL-MCNC: 2.3 MG/DL — SIGNIFICANT CHANGE UP (ref 1.6–2.6)
MCHC RBC-ENTMCNC: 29.6 PG — SIGNIFICANT CHANGE UP (ref 27–34)
MCHC RBC-ENTMCNC: 33.3 % — SIGNIFICANT CHANGE UP (ref 32–36)
MCV RBC AUTO: 89.1 FL — SIGNIFICANT CHANGE UP (ref 80–100)
MONOCYTES # BLD AUTO: 0.43 K/UL — SIGNIFICANT CHANGE UP (ref 0–0.9)
MONOCYTES NFR BLD AUTO: 2.7 % — SIGNIFICANT CHANGE UP (ref 2–14)
NEUTROPHILS # BLD AUTO: 14.32 K/UL — HIGH (ref 1.8–7.4)
NEUTROPHILS NFR BLD AUTO: 89.4 % — HIGH (ref 43–77)
NRBC # FLD: 0 K/UL — SIGNIFICANT CHANGE UP (ref 0–0)
PHOSPHATE SERPL-MCNC: 3.2 MG/DL — SIGNIFICANT CHANGE UP (ref 2.5–4.5)
PLATELET # BLD AUTO: 410 K/UL — HIGH (ref 150–400)
PMV BLD: 9.2 FL — SIGNIFICANT CHANGE UP (ref 7–13)
POTASSIUM SERPL-MCNC: 4.5 MMOL/L — SIGNIFICANT CHANGE UP (ref 3.5–5.3)
POTASSIUM SERPL-SCNC: 4.5 MMOL/L — SIGNIFICANT CHANGE UP (ref 3.5–5.3)
RBC # BLD: 4.79 M/UL — SIGNIFICANT CHANGE UP (ref 4.2–5.8)
RBC # FLD: 13.6 % — SIGNIFICANT CHANGE UP (ref 10.3–14.5)
SODIUM SERPL-SCNC: 137 MMOL/L — SIGNIFICANT CHANGE UP (ref 135–145)
WBC # BLD: 16.02 K/UL — HIGH (ref 3.8–10.5)
WBC # FLD AUTO: 16.02 K/UL — HIGH (ref 3.8–10.5)

## 2020-04-08 PROCEDURE — 99232 SBSQ HOSP IP/OBS MODERATE 35: CPT

## 2020-04-08 RX ADMIN — ENOXAPARIN SODIUM 40 MILLIGRAM(S): 100 INJECTION SUBCUTANEOUS at 18:11

## 2020-04-08 RX ADMIN — Medication 40 MILLIGRAM(S): at 18:11

## 2020-04-08 RX ADMIN — Medication 200 MILLIGRAM(S): at 04:49

## 2020-04-08 RX ADMIN — Medication 200 MILLIGRAM(S): at 18:10

## 2020-04-08 NOTE — PROGRESS NOTE ADULT - SUBJECTIVE AND OBJECTIVE BOX
Progress Note:   · Provider Specialty	Internal Medicine	    Reason for Admission:    Reason for Admission:  · Reason for Admission	respiratory distress, hypoxia	      · Subjective and Objective: 	  Subjective/Interval Events: pt feels much better since admission. SOB improved on NRB, able to eat now.     Covid positive 4/6    Hospital Medications:  acetaminophen   Tablet .. 650 milliGRAM(s) Oral every 4 hours PRN  acetaminophen  Suppository .. 650 milliGRAM(s) Rectal every 4 hours PRN  ALBUTerol    90 MICROgram(s) HFA Inhaler 2 Puff(s) Inhalation every 6 hours PRN  amLODIPine   Tablet 10 milliGRAM(s) Oral daily  enoxaparin Injectable 40 milliGRAM(s) SubCutaneous daily  hydroxychloroquine 200 milliGRAM(s) Oral every 12 hours  hydroxychloroquine   Oral   methylPREDNISolone sodium succinate Injectable 40 milliGRAM(s) IV Push two times a day      Physical Exam:  T(C): 37 (04-07-20 @ 06:17), Max: 37.8 (04-06-20 @ 16:41)  HR: 96 (04-07-20 @ 06:17) (96 - 104)  BP: 133/84 (04-07-20 @ 06:17) (133/84 - 168/92)  RR: 19 (04-07-20 @ 06:17) (19 - 45)  SpO2: 93% (04-07-20 @ 06:17) (86% - 98%)    Comfortable appearing  On NRB  No respiratory distress    Labs: reviewed    Today    Wbc 16    Hgb 14.2    Plt 410    Na 137    K 4.5    Creat 1.18                        14.7   9.40  )-----------( 342      ( 07 Apr 2020 06:34 )             43.5     04-07    134<L>  |  99  |  14  ----------------------------<  125<H>  4.4   |  24  |  1.02    Ca    9.1      07 Apr 2020 06:34    TPro  7.5  /  Alb  3.7  /  TBili  0.4  /  DBili  x   /  AST  74<H>  /  ALT  56<H>  /  AlkPhos  59  04-07    LIVER FUNCTIONS - ( 07 Apr 2020 06:34 )  Alb: 3.7 g/dL / Pro: 7.5 g/dL / ALK PHOS: 59 u/L / ALT: 56 u/L / AST: 74 u/L / GGT: x               Diagnostic Studies: reviewed        Assessment and Plan:   · Assessment		  44M w/ HTN p/w 1 week of fevers and weakness, found to be hypoxic in UC. COVID-19 +.    Today, 02 NRB, comfortable, supine    Converses with ease, eating, comfortable    Plaquenil started 4-6  Solumedrol started 4-6  Given K x 1 4/6    COVID-19 Test: +4/6  Oxygen Support: NRB  Prognostic labs: Ferritin 510; CRP 70  QTC: 429  Hydroxychloroquine: starting 4/6  Steroids: starting 4/6  Anakinra: n/a    Problem/Plan:  #Hypoxic respiratory failure 2/2 COVID-19 Infection  - HCQ ending 4/11  - steroids ending 4/9  - Tylenol PRN fever  - albuterol PRN SOB    #Chronic Medical Problems:  - HTN: c/w amlodipine    #FEN/PPx  - lovenox SubQ  - regular diet    #FULL CODE    Yvan Salazar MD    Acting Hospitalist    [phone] Progress Note:   · Provider Specialty	Internal Medicine	    Reason for Admission:    Reason for Admission:  · Reason for Admission	respiratory distress, hypoxia	      · Subjective and Objective: 	  Subjective/Interval Events: pt feels much better since admission. SOB improved on NRB, able to eat now.     Covid positive 4/6    P 90    02 91% on NRB    Comfortable, sitting and supine    98.8 T    Eatign well    Speaks easy    To start Anikira    Discussed with [facility]    Hospital Medications:  acetaminophen   Tablet .. 650 milliGRAM(s) Oral every 4 hours PRN  acetaminophen  Suppository .. 650 milliGRAM(s) Rectal every 4 hours PRN  ALBUTerol    90 MICROgram(s) HFA Inhaler 2 Puff(s) Inhalation every 6 hours PRN  amLODIPine   Tablet 10 milliGRAM(s) Oral daily  enoxaparin Injectable 40 milliGRAM(s) SubCutaneous daily  hydroxychloroquine 200 milliGRAM(s) Oral every 12 hours  hydroxychloroquine   Oral   methylPREDNISolone sodium succinate Injectable 40 milliGRAM(s) IV Push two times a day      Physical Exam:  T(C): 37 (04-07-20 @ 06:17), Max: 37.8 (04-06-20 @ 16:41)  HR: 96 (04-07-20 @ 06:17) (96 - 104)  BP: 133/84 (04-07-20 @ 06:17) (133/84 - 168/92)  RR: 19 (04-07-20 @ 06:17) (19 - 45)  SpO2: 93% (04-07-20 @ 06:17) (86% - 98%)    Comfortable appearing  On NRB  No respiratory distress    Labs: reviewed    Today    wbc 13    hbg 14    plt 438    ferr 469, down from 589    procalcitonin is normal    Na 133    K 4.3    creat .93                      14.7   9.40  )-----------( 342      ( 07 Apr 2020 06:34 )             43.5     04-07    134<L>  |  99  |  14  ----------------------------<  125<H>  4.4   |  24  |  1.02    Ca    9.1      07 Apr 2020 06:34    TPro  7.5  /  Alb  3.7  /  TBili  0.4  /  DBili  x   /  AST  74<H>  /  ALT  56<H>  /  AlkPhos  59  04-07    LIVER FUNCTIONS - ( 07 Apr 2020 06:34 )  Alb: 3.7 g/dL / Pro: 7.5 g/dL / ALK PHOS: 59 u/L / ALT: 56 u/L / AST: 74 u/L / GGT: x               Diagnostic Studies: reviewed        Assessment and Plan:   · Assessment		  44M w/ HTN p/w 1 week of fevers and weakness, found to be hypoxic in UC. COVID-19 +.    Today, 02 NRB, comfortable, supine    Converses with ease, eating, comfortable    Plaquenil started 4-6  Solumedrol started 4-6  Given K x 1 4/6    COVID-19 Test: +4/6  Oxygen Support: NRB  Prognostic labs: Ferritin 510; CRP 70  QTC: 429  Hydroxychloroquine: starting 4/6  Steroids: starting 4/6  Anakinra:  ordered by ACP today    Problem/Plan:  #Hypoxic respiratory failure 2/2 COVID-19 Infection  - HCQ ending 4/11  - steroids ending 4/9  - Tylenol PRN fever  - albuterol PRN SOB    #Chronic Medical Problems:  - HTN: c/w amlodipine    #FEN/PPx  - lovenox SubQ  - regular diet    #FULL CODE    Yvan Salazar MD    Acting Hospitalist    [phone]    discussed with ACP

## 2020-04-09 LAB
ALBUMIN SERPL ELPH-MCNC: 3.3 G/DL — SIGNIFICANT CHANGE UP (ref 3.3–5)
ALP SERPL-CCNC: 77 U/L — SIGNIFICANT CHANGE UP (ref 40–120)
ALT FLD-CCNC: 42 U/L — HIGH (ref 4–41)
ANION GAP SERPL CALC-SCNC: 17 MMO/L — HIGH (ref 7–14)
AST SERPL-CCNC: 37 U/L — SIGNIFICANT CHANGE UP (ref 4–40)
BILIRUB SERPL-MCNC: 0.5 MG/DL — SIGNIFICANT CHANGE UP (ref 0.2–1.2)
BUN SERPL-MCNC: 18 MG/DL — SIGNIFICANT CHANGE UP (ref 7–23)
CALCIUM SERPL-MCNC: 9 MG/DL — SIGNIFICANT CHANGE UP (ref 8.4–10.5)
CHLORIDE SERPL-SCNC: 98 MMOL/L — SIGNIFICANT CHANGE UP (ref 98–107)
CO2 SERPL-SCNC: 22 MMOL/L — SIGNIFICANT CHANGE UP (ref 22–31)
CREAT SERPL-MCNC: 0.93 MG/DL — SIGNIFICANT CHANGE UP (ref 0.5–1.3)
FERRITIN SERPL-MCNC: 469.6 NG/ML — HIGH (ref 30–400)
GLUCOSE SERPL-MCNC: 183 MG/DL — HIGH (ref 70–99)
HCT VFR BLD CALC: 41.6 % — SIGNIFICANT CHANGE UP (ref 39–50)
HGB BLD-MCNC: 14 G/DL — SIGNIFICANT CHANGE UP (ref 13–17)
LDH SERPL L TO P-CCNC: 548 U/L — HIGH (ref 135–225)
MAGNESIUM SERPL-MCNC: 2.2 MG/DL — SIGNIFICANT CHANGE UP (ref 1.6–2.6)
MCHC RBC-ENTMCNC: 29.5 PG — SIGNIFICANT CHANGE UP (ref 27–34)
MCHC RBC-ENTMCNC: 33.7 % — SIGNIFICANT CHANGE UP (ref 32–36)
MCV RBC AUTO: 87.6 FL — SIGNIFICANT CHANGE UP (ref 80–100)
NRBC # FLD: 0 K/UL — SIGNIFICANT CHANGE UP (ref 0–0)
PHOSPHATE SERPL-MCNC: 3.5 MG/DL — SIGNIFICANT CHANGE UP (ref 2.5–4.5)
PLATELET # BLD AUTO: 438 K/UL — HIGH (ref 150–400)
PMV BLD: 8.6 FL — SIGNIFICANT CHANGE UP (ref 7–13)
POTASSIUM SERPL-MCNC: 4.3 MMOL/L — SIGNIFICANT CHANGE UP (ref 3.5–5.3)
POTASSIUM SERPL-SCNC: 4.3 MMOL/L — SIGNIFICANT CHANGE UP (ref 3.5–5.3)
PROCALCITONIN SERPL-MCNC: 0.05 NG/ML — SIGNIFICANT CHANGE UP (ref 0.02–0.1)
PROT SERPL-MCNC: 6.9 G/DL — SIGNIFICANT CHANGE UP (ref 6–8.3)
RBC # BLD: 4.75 M/UL — SIGNIFICANT CHANGE UP (ref 4.2–5.8)
RBC # FLD: 13.3 % — SIGNIFICANT CHANGE UP (ref 10.3–14.5)
SODIUM SERPL-SCNC: 137 MMOL/L — SIGNIFICANT CHANGE UP (ref 135–145)
WBC # BLD: 13.71 K/UL — HIGH (ref 3.8–10.5)
WBC # FLD AUTO: 13.71 K/UL — HIGH (ref 3.8–10.5)

## 2020-04-09 PROCEDURE — 99232 SBSQ HOSP IP/OBS MODERATE 35: CPT

## 2020-04-09 RX ORDER — ENOXAPARIN SODIUM 100 MG/ML
40 INJECTION SUBCUTANEOUS
Refills: 0 | Status: DISCONTINUED | OUTPATIENT
Start: 2020-04-09 | End: 2020-04-14

## 2020-04-09 RX ORDER — ANAKINRA 100MG/0.67
100 SYRINGE (ML) SUBCUTANEOUS
Refills: 0 | Status: COMPLETED | OUTPATIENT
Start: 2020-04-09 | End: 2020-04-12

## 2020-04-09 RX ADMIN — Medication 100 MILLIGRAM(S): at 18:05

## 2020-04-09 RX ADMIN — Medication 200 MILLIGRAM(S): at 18:06

## 2020-04-09 RX ADMIN — AMLODIPINE BESYLATE 10 MILLIGRAM(S): 2.5 TABLET ORAL at 05:21

## 2020-04-09 RX ADMIN — Medication 100 MILLIGRAM(S): at 23:53

## 2020-04-09 RX ADMIN — ENOXAPARIN SODIUM 40 MILLIGRAM(S): 100 INJECTION SUBCUTANEOUS at 18:05

## 2020-04-09 RX ADMIN — Medication 40 MILLIGRAM(S): at 05:21

## 2020-04-09 RX ADMIN — Medication 200 MILLIGRAM(S): at 05:21

## 2020-04-09 NOTE — PROGRESS NOTE ADULT - SUBJECTIVE AND OBJECTIVE BOX
Progress Note:   · Provider Specialty	Internal Medicine	    Reason for Admission:    Reason for Admission:  · Reason for Admission	respiratory distress, hypoxia	      · Subjective and Objective: 	  Progress Note:   · Provider Specialty	Internal Medicine	    Reason for Admission:    Reason for Admission:  · Reason for Admission	respiratory distress, hypoxia	      · Subjective and Objective: 	  Subjective/Interval Events: pt feels much better since admission. SOB improved on NRB, able to eat now.     Covid positive 4/6    P 90    02 91% on NRB    Exhale incentive spirometry started    Comfortable, sitting and supine    98.8 T    Eatign well    Speaks easy     Anikira started    Discussed with [facility] Medications:  acetaminophen   Tablet .. 650 milliGRAM(s) Oral every 4 hours PRN  acetaminophen  Suppository .. 650 milliGRAM(s) Rectal every 4 hours PRN  ALBUTerol    90 MICROgram(s) HFA Inhaler 2 Puff(s) Inhalation every 6 hours PRN  amLODIPine   Tablet 10 milliGRAM(s) Oral daily  enoxaparin Injectable 40 milliGRAM(s) SubCutaneous daily  hydroxychloroquine 200 milliGRAM(s) Oral every 12 hours  hydroxychloroquine   Oral   methylPREDNISolone sodium succinate Injectable 40 milliGRAM(s) IV Push two times a day      Physical Exam:  T(C): 37 (04-07-20 @ 06:17), Max: 37.8 (04-06-20 @ 16:41)  HR: 96 (04-07-20 @ 06:17) (96 - 104)  BP: 133/84 (04-07-20 @ 06:17) (133/84 - 168/92)  RR: 19 (04-07-20 @ 06:17) (19 - 45)  SpO2: 93% (04-07-20 @ 06:17) (86% - 98%)    Comfortable appearing  On NRB  No respiratory distress    Labs: reviewed    Today    wbc 13    hbg 14    plt 438    ferr 469, down from 589    procalcitonin is normal    Na 133    K 4.3    creat .93                      14.7   9.40  )-----------( 342      ( 07 Apr 2020 06:34 )             43.5     04-07    134<L>  |  99  |  14  ----------------------------<  125<H>  4.4   |  24  |  1.02    Ca    9.1      07 Apr 2020 06:34    TPro  7.5  /  Alb  3.7  /  TBili  0.4  /  DBili  x   /  AST  74<H>  /  ALT  56<H>  /  AlkPhos  59  04-07    LIVER FUNCTIONS - ( 07 Apr 2020 06:34 )  Alb: 3.7 g/dL / Pro: 7.5 g/dL / ALK PHOS: 59 u/L / ALT: 56 u/L / AST: 74 u/L / GGT: x               Diagnostic Studies: reviewed        Assessment and Plan:   · Assessment		  44M w/ HTN p/w 1 week of fevers and weakness, found to be hypoxic in UC. COVID-19 +.    Today, 02 NRB, comfortable, supine    Converses with ease, eating, comfortable    Plaquenil started 4-6  Solumedrol started 4-6  Given K x 1 4/6    COVID-19 Test: +4/6  Oxygen Support: NRB    Continue exhale incentive spirometry    Prognostic labs: Ferritin 510; CRP 70  QTC: 429  Hydroxychloroquine: starting 4/6  Steroids: starting 4/6  Anakinra:  starting 4-8    Problem/Plan:  #Hypoxic respiratory failure 2/2 COVID-19 Infection  - HCQ ending 4/11  - steroids ending 4/9  - Tylenol PRN fever  - albuterol PRN SOB    #Chronic Medical Problems:  - HTN: c/w amlodipine    #FEN/PPx  - lovenox SubQ  - regular diet    #FULL CODE    Yvan Salazar MD    Acting Hospitalist    [phone]    discussed with ACP Progress Note:   · Provider Specialty	Internal Medicine	    Reason for Admission:    Reason for Admission:  · Reason for Admission	respiratory distress, hypoxia	      · Subjective and Objective: 	  Progress Note:   · Provider Specialty	Internal Medicine	    Reason for Admission:    Reason for Admission:  · Reason for Admission	respiratory distress, hypoxia	      · Subjective and Objective: 	  Subjective/Interval Events: pt feels much better since admission. SOB improved on NRB, able to eat now.     Covid positive 4/6    P 90    02 91% on NRB    Exhale incentive spirometry started    Comfortable, sitting and supine    98.8 T    Eatign well    Speaks easy     Anikira started    Discussed with ACP    He does not want me to call family, he calls by cell to update    Hospital Medications:  acetaminophen   Tablet .. 650 milliGRAM(s) Oral every 4 hours PRN  acetaminophen  Suppository .. 650 milliGRAM(s) Rectal every 4 hours PRN  ALBUTerol    90 MICROgram(s) HFA Inhaler 2 Puff(s) Inhalation every 6 hours PRN  amLODIPine   Tablet 10 milliGRAM(s) Oral daily  enoxaparin Injectable 40 milliGRAM(s) SubCutaneous daily  hydroxychloroquine 200 milliGRAM(s) Oral every 12 hours  hydroxychloroquine   Oral   methylPREDNISolone sodium succinate Injectable 40 milliGRAM(s) IV Push two times a day      Physical Exam:  T(C): 37 (04-07-20 @ 06:17), Max: 37.8 (04-06-20 @ 16:41)  HR: 96 (04-07-20 @ 06:17) (96 - 104)  BP: 133/84 (04-07-20 @ 06:17) (133/84 - 168/92)  RR: 19 (04-07-20 @ 06:17) (19 - 45)  SpO2: 93% (04-07-20 @ 06:17) (86% - 98%)    Comfortable appearing  On NRB  No respiratory distress    Labs: reviewed    Today    wbc 13    hbg 14    plt 438    ferr 469, down from 589    procalcitonin is normal    Na 133    K 4.3    creat .93                      14.7   9.40  )-----------( 342      ( 07 Apr 2020 06:34 )             43.5     04-07    134<L>  |  99  |  14  ----------------------------<  125<H>  4.4   |  24  |  1.02    Ca    9.1      07 Apr 2020 06:34    TPro  7.5  /  Alb  3.7  /  TBili  0.4  /  DBili  x   /  AST  74<H>  /  ALT  56<H>  /  AlkPhos  59  04-07    LIVER FUNCTIONS - ( 07 Apr 2020 06:34 )  Alb: 3.7 g/dL / Pro: 7.5 g/dL / ALK PHOS: 59 u/L / ALT: 56 u/L / AST: 74 u/L / GGT: x               Diagnostic Studies: reviewed        Assessment and Plan:   · Assessment		  44M w/ HTN p/w 1 week of fevers and weakness, found to be hypoxic in UC. COVID-19 +.    Today, 02 NRB, comfortable, supine    Converses with ease, eating, comfortable    Plaquenil started 4-6  Solumedrol started 4-6  Given K x 1 4/6    COVID-19 Test: +4/6  Oxygen Support: NRB    Continue exhale incentive spirometry    Prognostic labs: Ferritin 510; CRP 70  QTC: 429  Hydroxychloroquine: starting 4/6  Steroids: starting 4/6  Anakinra:  starting 4-8    Problem/Plan:  #Hypoxic respiratory failure 2/2 COVID-19 Infection  - HCQ ending 4/11  - steroids ending 4/9  - Tylenol PRN fever  - albuterol PRN SOB    #Chronic Medical Problems:  - HTN: c/w amlodipine    #FEN/PPx  - lovenox SubQ  - regular diet    #FULL CODE    Yvan Salazar MD    Acting Hospitalist    [phone]    discussed with ACP

## 2020-04-10 LAB
ALBUMIN SERPL ELPH-MCNC: 3.2 G/DL — LOW (ref 3.3–5)
ALP SERPL-CCNC: 72 U/L — SIGNIFICANT CHANGE UP (ref 40–120)
ALT FLD-CCNC: 38 U/L — SIGNIFICANT CHANGE UP (ref 4–41)
ANION GAP SERPL CALC-SCNC: 11 MMO/L — SIGNIFICANT CHANGE UP (ref 7–14)
AST SERPL-CCNC: 30 U/L — SIGNIFICANT CHANGE UP (ref 4–40)
BILIRUB SERPL-MCNC: 0.5 MG/DL — SIGNIFICANT CHANGE UP (ref 0.2–1.2)
BUN SERPL-MCNC: 17 MG/DL — SIGNIFICANT CHANGE UP (ref 7–23)
CALCIUM SERPL-MCNC: 8.8 MG/DL — SIGNIFICANT CHANGE UP (ref 8.4–10.5)
CHLORIDE SERPL-SCNC: 101 MMOL/L — SIGNIFICANT CHANGE UP (ref 98–107)
CO2 SERPL-SCNC: 27 MMOL/L — SIGNIFICANT CHANGE UP (ref 22–31)
CREAT SERPL-MCNC: 1.04 MG/DL — SIGNIFICANT CHANGE UP (ref 0.5–1.3)
D DIMER BLD IA.RAPID-MCNC: 5667 NG/ML — SIGNIFICANT CHANGE UP
GLUCOSE SERPL-MCNC: 90 MG/DL — SIGNIFICANT CHANGE UP (ref 70–99)
HCT VFR BLD CALC: 43.9 % — SIGNIFICANT CHANGE UP (ref 39–50)
HGB BLD-MCNC: 14.6 G/DL — SIGNIFICANT CHANGE UP (ref 13–17)
MAGNESIUM SERPL-MCNC: 2.4 MG/DL — SIGNIFICANT CHANGE UP (ref 1.6–2.6)
MCHC RBC-ENTMCNC: 29.4 PG — SIGNIFICANT CHANGE UP (ref 27–34)
MCHC RBC-ENTMCNC: 33.3 % — SIGNIFICANT CHANGE UP (ref 32–36)
MCV RBC AUTO: 88.3 FL — SIGNIFICANT CHANGE UP (ref 80–100)
NRBC # FLD: 0.02 K/UL — SIGNIFICANT CHANGE UP (ref 0–0)
PHOSPHATE SERPL-MCNC: 3.1 MG/DL — SIGNIFICANT CHANGE UP (ref 2.5–4.5)
PLATELET # BLD AUTO: 445 K/UL — HIGH (ref 150–400)
PMV BLD: 8.8 FL — SIGNIFICANT CHANGE UP (ref 7–13)
POTASSIUM SERPL-MCNC: 4.3 MMOL/L — SIGNIFICANT CHANGE UP (ref 3.5–5.3)
POTASSIUM SERPL-SCNC: 4.3 MMOL/L — SIGNIFICANT CHANGE UP (ref 3.5–5.3)
PROT SERPL-MCNC: 7 G/DL — SIGNIFICANT CHANGE UP (ref 6–8.3)
RBC # BLD: 4.97 M/UL — SIGNIFICANT CHANGE UP (ref 4.2–5.8)
RBC # FLD: 13.2 % — SIGNIFICANT CHANGE UP (ref 10.3–14.5)
SODIUM SERPL-SCNC: 139 MMOL/L — SIGNIFICANT CHANGE UP (ref 135–145)
WBC # BLD: 13.77 K/UL — HIGH (ref 3.8–10.5)
WBC # FLD AUTO: 13.77 K/UL — HIGH (ref 3.8–10.5)

## 2020-04-10 PROCEDURE — 93010 ELECTROCARDIOGRAM REPORT: CPT

## 2020-04-10 RX ADMIN — Medication 100 MILLIGRAM(S): at 11:48

## 2020-04-10 RX ADMIN — ENOXAPARIN SODIUM 40 MILLIGRAM(S): 100 INJECTION SUBCUTANEOUS at 17:01

## 2020-04-10 RX ADMIN — Medication 200 MILLIGRAM(S): at 17:01

## 2020-04-10 RX ADMIN — Medication 200 MILLIGRAM(S): at 04:59

## 2020-04-10 RX ADMIN — ENOXAPARIN SODIUM 40 MILLIGRAM(S): 100 INJECTION SUBCUTANEOUS at 04:59

## 2020-04-10 RX ADMIN — Medication 100 MILLIGRAM(S): at 04:59

## 2020-04-10 RX ADMIN — Medication 100 MILLIGRAM(S): at 17:01

## 2020-04-10 RX ADMIN — AMLODIPINE BESYLATE 10 MILLIGRAM(S): 2.5 TABLET ORAL at 04:59

## 2020-04-10 NOTE — PROGRESS NOTE ADULT - ASSESSMENT
44M w/ HTN p/w 1 week of fevers and weakness, found to be hypoxic in UC. COVID-19 +.    Plaquenil started 4-6  Solumedrol started 4-6  Given K x 1 4/6 COVID-19 Test: +4/6  Oxygen Support: NRB    Continue exhale incentive spirometry    Prognostic labs: Ferritin 510; CRP 70  QTC: 429  Hydroxychloroquine: started 4/6  Steroids: started 4/6  Anakinra:  started 4-8    Problem/Plan:  #Hypoxic respiratory failure 2/2 COVID-19 Infection  - HCQ ending 4/11  - steroids ended 4/9  - c/w Anakinra  - Tylenol PRN fever  - albuterol PRN SOB    #Chronic Medical Problems:  - HTN: c/w amlodipine    #FEN/PPx  - lovenox SubQ BID  - regular diet    #FULL CODE 44M w/ HTN p/w 1 week of fevers and weakness, found to be hypoxic in UC. COVID-19 +.    Plaquenil started 4-6  Solumedrol started 4-6  Given K x 1 4/6 COVID-19 Test: +4/6  Oxygen Support: NRB    Continue exhale incentive spirometry    Prognostic labs: Ferritin 510; CRP 70  QTC: 429  Hydroxychloroquine: started 4/6  Steroids: started 4/6  Anakinra:  started 4-8    Problem/Plan:  #Hypoxic respiratory failure 2/2 COVID-19 Infection  - Weaned off NRB  - HCQ ending 4/11  - steroids ended 4/9  - c/w Anakinra  - Tylenol PRN fever  - albuterol PRN SOB    #Chronic Medical Problems:  - HTN: c/w amlodipine    #FEN/PPx  - lovenox SubQ BID  - regular diet    #FULL CODE

## 2020-04-10 NOTE — PROGRESS NOTE ADULT - SUBJECTIVE AND OBJECTIVE BOX
Medicine Progress Note    ***************************************************************  CONTACT INFO  Nick Mccall M.D., PGY-2  Pager: [phone] ([facility]) / [phone] ([facility])    ***************************************************************    Patient is a 44y old  Male who presents with a chief complaint of respiratory distress, hypoxia (09 Apr 2020 17:07)      SUBJECTIVE / OVERNIGHT EVENTS: Patient seen and examined at bedside. Vital signs stable overnight. Remains on NRB.    Patient denies headache, dizziness, chest/abd pain, shortness of breath. ROS negative unless otherwise stated.     ADDITIONAL REVIEW OF SYSTEMS:    MEDICATIONS  (STANDING):  amLODIPine   Tablet 10 milliGRAM(s) Oral daily  anakinra Injectable 100 milliGRAM(s) SubCutaneous <User Schedule>  enoxaparin Injectable 40 milliGRAM(s) SubCutaneous two times a day  hydroxychloroquine 200 milliGRAM(s) Oral every 12 hours  hydroxychloroquine   Oral     MEDICATIONS  (PRN):  acetaminophen   Tablet .. 650 milliGRAM(s) Oral every 4 hours PRN Temp greater or equal to 38.5C (101.3F)  acetaminophen  Suppository .. 650 milliGRAM(s) Rectal every 4 hours PRN Temp greater or equal to 38.5C (101.3F)  ALBUTerol    90 MICROgram(s) HFA Inhaler 2 Puff(s) Inhalation every 6 hours PRN Shortness of Breath  aluminum hydroxide/magnesium hydroxide/simethicone Suspension 30 milliLiter(s) Oral every 6 hours PRN Dyspepsia    CAPILLARY BLOOD GLUCOSE        I&O's Summary    09 Apr 2020 07:01  -  10 Apr 2020 07:00  --------------------------------------------------------  IN: 0 mL / OUT: 1200 mL / NET: -1200 mL        PHYSICAL EXAM:  Vital Signs Last 24 Hrs  T(C): 37 (10 Apr 2020 04:56), Max: 37.1 (09 Apr 2020 10:50)  T(F): 98.6 (10 Apr 2020 04:56), Max: 98.8 (09 Apr 2020 10:50)  HR: 81 (10 Apr 2020 04:56) (81 - 91)  BP: 129/90 (10 Apr 2020 04:56) (129/90 - 140/86)  BP(mean): --  RR: 18 (10 Apr 2020 04:56) (18 - 20)  SpO2: 98% (10 Apr 2020 04:56) (93% - 98%)    CONSTITUTIONAL: NAD, well-developed, well-groomed  ENMT: Moist oral mucosa, no pharyngeal injection or exudates; normal dentition  RESPIRATORY: Normal respiratory effort; lungs are clear to auscultation bilaterally  CARDIOVASCULAR: Regular rate and rhythm, normal S1 and S2, no murmur/rub/gallop; No lower extremity edema; Peripheral pulses are 2+ bilaterally  ABDOMEN: Nontender to palpation, normoactive bowel sounds, no rebound/guarding; No hepatosplenomegaly  PSYCH: A+O to person, place, and time; affect appropriate  NEUROLOGY: CN 2-12 are intact and symmetric; no gross sensory deficits   SKIN: No rashes; no palpable lesions    LABS:                        14.6   13.77 )-----------( 445      ( 10 Apr 2020 04:49 )             43.9     04-09    137  |  98  |  18  ----------------------------<  183<H>  4.3   |  22  |  0.93    Ca    9.0      09 Apr 2020 11:20  Phos  3.5     04-09  Mg     2.2     04-09    TPro  6.9  /  Alb  3.3  /  TBili  0.5  /  DBili  x   /  AST  37  /  ALT  42<H>  /  AlkPhos  77  04-09              COVID-19 PCR: Detected (06 Apr 2020 12:58)      RADIOLOGY & ADDITIONAL TESTS:  Imaging from Last 24 Hours:    Electrocardiogram/QTc Interval:    COORDINATION OF CARE:  Care Discussed with Consultants/Other Providers: Medicine Progress Note    ***************************************************************  CONTACT INFO  Nick Mccall M.D., PGY-2  Pager: [phone] ([facility]) / [phone] ([facility])    ***************************************************************    Patient is a 44y old  Male who presents with a chief complaint of respiratory distress, hypoxia (09 Apr 2020 17:07)      SUBJECTIVE / OVERNIGHT EVENTS: Patient seen and examined at bedside. Vital signs stable overnight. RN able to wean off NRB to 6L NC.     Patient denies headache, dizziness, chest/abd pain, shortness of breath. ROS negative unless otherwise stated.     ADDITIONAL REVIEW OF SYSTEMS:    MEDICATIONS  (STANDING):  amLODIPine   Tablet 10 milliGRAM(s) Oral daily  anakinra Injectable 100 milliGRAM(s) SubCutaneous <User Schedule>  enoxaparin Injectable 40 milliGRAM(s) SubCutaneous two times a day  hydroxychloroquine 200 milliGRAM(s) Oral every 12 hours  hydroxychloroquine   Oral     MEDICATIONS  (PRN):  acetaminophen   Tablet .. 650 milliGRAM(s) Oral every 4 hours PRN Temp greater or equal to 38.5C (101.3F)  acetaminophen  Suppository .. 650 milliGRAM(s) Rectal every 4 hours PRN Temp greater or equal to 38.5C (101.3F)  ALBUTerol    90 MICROgram(s) HFA Inhaler 2 Puff(s) Inhalation every 6 hours PRN Shortness of Breath  aluminum hydroxide/magnesium hydroxide/simethicone Suspension 30 milliLiter(s) Oral every 6 hours PRN Dyspepsia    CAPILLARY BLOOD GLUCOSE        I&O's Summary    09 Apr 2020 07:01  -  10 Apr 2020 07:00  --------------------------------------------------------  IN: 0 mL / OUT: 1200 mL / NET: -1200 mL        PHYSICAL EXAM:  Vital Signs Last 24 Hrs  T(C): 37 (10 Apr 2020 04:56), Max: 37.1 (09 Apr 2020 10:50)  T(F): 98.6 (10 Apr 2020 04:56), Max: 98.8 (09 Apr 2020 10:50)  HR: 81 (10 Apr 2020 04:56) (81 - 91)  BP: 129/90 (10 Apr 2020 04:56) (129/90 - 140/86)  BP(mean): --  RR: 18 (10 Apr 2020 04:56) (18 - 20)  SpO2: 98% (10 Apr 2020 04:56) (93% - 98%)    CONSTITUTIONAL: NAD, well-developed, well-groomed  ENMT: Moist oral mucosa, no pharyngeal injection or exudates; normal dentition  RESPIRATORY: Normal respiratory effort; lungs are clear to auscultation bilaterally  CARDIOVASCULAR: Regular rate and rhythm, normal S1 and S2, no murmur/rub/gallop; No lower extremity edema; Peripheral pulses are 2+ bilaterally  ABDOMEN: Nontender to palpation, normoactive bowel sounds, no rebound/guarding; No hepatosplenomegaly  PSYCH: A+O to person, place, and time; affect appropriate  NEUROLOGY: CN 2-12 are intact and symmetric; no gross sensory deficits   SKIN: No rashes; no palpable lesions    LABS:                        14.6   13.77 )-----------( 445      ( 10 Apr 2020 04:49 )             43.9     04-09    137  |  98  |  18  ----------------------------<  183<H>  4.3   |  22  |  0.93    Ca    9.0      09 Apr 2020 11:20  Phos  3.5     04-09  Mg     2.2     04-09    TPro  6.9  /  Alb  3.3  /  TBili  0.5  /  DBili  x   /  AST  37  /  ALT  42<H>  /  AlkPhos  77  04-09              COVID-19 PCR: Detected (06 Apr 2020 12:58)      RADIOLOGY & ADDITIONAL TESTS:  Imaging from Last 24 Hours:    Electrocardiogram/QTc Interval:    COORDINATION OF CARE:  Care Discussed with Consultants/Other Providers:

## 2020-04-11 LAB
ALBUMIN SERPL ELPH-MCNC: 3.2 G/DL — LOW (ref 3.3–5)
ALP SERPL-CCNC: 70 U/L — SIGNIFICANT CHANGE UP (ref 40–120)
ALT FLD-CCNC: 37 U/L — SIGNIFICANT CHANGE UP (ref 4–41)
ANION GAP SERPL CALC-SCNC: 13 MMO/L — SIGNIFICANT CHANGE UP (ref 7–14)
AST SERPL-CCNC: 35 U/L — SIGNIFICANT CHANGE UP (ref 4–40)
BASOPHILS # BLD AUTO: 0.08 K/UL — SIGNIFICANT CHANGE UP (ref 0–0.2)
BASOPHILS NFR BLD AUTO: 0.9 % — SIGNIFICANT CHANGE UP (ref 0–2)
BILIRUB SERPL-MCNC: 0.5 MG/DL — SIGNIFICANT CHANGE UP (ref 0.2–1.2)
BUN SERPL-MCNC: 12 MG/DL — SIGNIFICANT CHANGE UP (ref 7–23)
CALCIUM SERPL-MCNC: 8.9 MG/DL — SIGNIFICANT CHANGE UP (ref 8.4–10.5)
CHLORIDE SERPL-SCNC: 98 MMOL/L — SIGNIFICANT CHANGE UP (ref 98–107)
CO2 SERPL-SCNC: 23 MMOL/L — SIGNIFICANT CHANGE UP (ref 22–31)
CREAT SERPL-MCNC: 0.94 MG/DL — SIGNIFICANT CHANGE UP (ref 0.5–1.3)
EOSINOPHIL # BLD AUTO: 0.05 K/UL — SIGNIFICANT CHANGE UP (ref 0–0.5)
EOSINOPHIL NFR BLD AUTO: 0.6 % — SIGNIFICANT CHANGE UP (ref 0–6)
GLUCOSE SERPL-MCNC: 81 MG/DL — SIGNIFICANT CHANGE UP (ref 70–99)
HCT VFR BLD CALC: 45.7 % — SIGNIFICANT CHANGE UP (ref 39–50)
HGB BLD-MCNC: 15 G/DL — SIGNIFICANT CHANGE UP (ref 13–17)
IMM GRANULOCYTES NFR BLD AUTO: 5.4 % — HIGH (ref 0–1.5)
LYMPHOCYTES # BLD AUTO: 1.5 K/UL — SIGNIFICANT CHANGE UP (ref 1–3.3)
LYMPHOCYTES # BLD AUTO: 17.4 % — SIGNIFICANT CHANGE UP (ref 13–44)
MAGNESIUM SERPL-MCNC: 2.5 MG/DL — SIGNIFICANT CHANGE UP (ref 1.6–2.6)
MANUAL SMEAR VERIFICATION: SIGNIFICANT CHANGE UP
MCHC RBC-ENTMCNC: 28.8 PG — SIGNIFICANT CHANGE UP (ref 27–34)
MCHC RBC-ENTMCNC: 32.8 % — SIGNIFICANT CHANGE UP (ref 32–36)
MCV RBC AUTO: 87.9 FL — SIGNIFICANT CHANGE UP (ref 80–100)
MONOCYTES # BLD AUTO: 0.77 K/UL — SIGNIFICANT CHANGE UP (ref 0–0.9)
MONOCYTES NFR BLD AUTO: 8.9 % — SIGNIFICANT CHANGE UP (ref 2–14)
NEUTROPHILS # BLD AUTO: 5.77 K/UL — SIGNIFICANT CHANGE UP (ref 1.8–7.4)
NEUTROPHILS NFR BLD AUTO: 66.8 % — SIGNIFICANT CHANGE UP (ref 43–77)
NRBC # FLD: 0.06 K/UL — SIGNIFICANT CHANGE UP (ref 0–0)
PHOSPHATE SERPL-MCNC: 3 MG/DL — SIGNIFICANT CHANGE UP (ref 2.5–4.5)
PLATELET # BLD AUTO: 461 K/UL — HIGH (ref 150–400)
PMV BLD: 8.4 FL — SIGNIFICANT CHANGE UP (ref 7–13)
POTASSIUM SERPL-MCNC: 4.5 MMOL/L — SIGNIFICANT CHANGE UP (ref 3.5–5.3)
POTASSIUM SERPL-SCNC: 4.5 MMOL/L — SIGNIFICANT CHANGE UP (ref 3.5–5.3)
PROT SERPL-MCNC: 6.7 G/DL — SIGNIFICANT CHANGE UP (ref 6–8.3)
RBC # BLD: 5.2 M/UL — SIGNIFICANT CHANGE UP (ref 4.2–5.8)
RBC # FLD: 13.2 % — SIGNIFICANT CHANGE UP (ref 10.3–14.5)
SODIUM SERPL-SCNC: 134 MMOL/L — LOW (ref 135–145)
WBC # BLD: 8.64 K/UL — SIGNIFICANT CHANGE UP (ref 3.8–10.5)
WBC # FLD AUTO: 8.64 K/UL — SIGNIFICANT CHANGE UP (ref 3.8–10.5)

## 2020-04-11 PROCEDURE — 99233 SBSQ HOSP IP/OBS HIGH 50: CPT

## 2020-04-11 RX ADMIN — Medication 100 MILLIGRAM(S): at 05:52

## 2020-04-11 RX ADMIN — AMLODIPINE BESYLATE 10 MILLIGRAM(S): 2.5 TABLET ORAL at 05:52

## 2020-04-11 RX ADMIN — Medication 200 MILLIGRAM(S): at 05:52

## 2020-04-11 RX ADMIN — Medication 100 MILLIGRAM(S): at 01:05

## 2020-04-11 RX ADMIN — ENOXAPARIN SODIUM 40 MILLIGRAM(S): 100 INJECTION SUBCUTANEOUS at 05:52

## 2020-04-11 RX ADMIN — Medication 100 MILLIGRAM(S): at 12:03

## 2020-04-11 RX ADMIN — Medication 100 MILLIGRAM(S): at 18:12

## 2020-04-11 RX ADMIN — ENOXAPARIN SODIUM 40 MILLIGRAM(S): 100 INJECTION SUBCUTANEOUS at 18:12

## 2020-04-11 RX ADMIN — Medication 100 MILLIGRAM(S): at 22:57

## 2020-04-11 NOTE — PROGRESS NOTE ADULT - SUBJECTIVE AND OBJECTIVE BOX
Medicine Progress Note    ***************************************************************  CONTACT INFO  Nick Mccall M.D., PGY-2  Pager: [phone] ([facility]) / [phone] ([facility])    ***************************************************************    Patient is a 44y old  Male who presents with a chief complaint of respiratory distress, hypoxia (10 Apr 2020 08:13)      SUBJECTIVE / OVERNIGHT EVENTS: Patient seen and examined at bedside. Vital signs stable overnight. On 6L NC with Spo2>92%.  Patient denies headache, dizziness, chest/abd pain, shortness of breath. ROS negative unless otherwise stated.     ADDITIONAL REVIEW OF SYSTEMS:    MEDICATIONS  (STANDING):  amLODIPine   Tablet 10 milliGRAM(s) Oral daily  anakinra Injectable 100 milliGRAM(s) SubCutaneous <User Schedule>  enoxaparin Injectable 40 milliGRAM(s) SubCutaneous two times a day    MEDICATIONS  (PRN):  acetaminophen   Tablet .. 650 milliGRAM(s) Oral every 4 hours PRN Temp greater or equal to 38.5C (101.3F)  acetaminophen  Suppository .. 650 milliGRAM(s) Rectal every 4 hours PRN Temp greater or equal to 38.5C (101.3F)  ALBUTerol    90 MICROgram(s) HFA Inhaler 2 Puff(s) Inhalation every 6 hours PRN Shortness of Breath  aluminum hydroxide/magnesium hydroxide/simethicone Suspension 30 milliLiter(s) Oral every 6 hours PRN Dyspepsia    CAPILLARY BLOOD GLUCOSE        I&O's Summary    10 Apr 2020 07:01  -  11 Apr 2020 07:00  --------------------------------------------------------  IN: 0 mL / OUT: 440 mL / NET: -440 mL        PHYSICAL EXAM:  Vital Signs Last 24 Hrs  T(C): 36.6 (10 Apr 2020 22:33), Max: 37.1 (10 Apr 2020 12:35)  T(F): 97.8 (10 Apr 2020 22:33), Max: 98.8 (10 Apr 2020 12:35)  HR: 92 (11 Apr 2020 05:50) (90 - 92)  BP: 121/86 (11 Apr 2020 05:50) (121/86 - 130/90)  BP(mean): --  RR: 19 (10 Apr 2020 22:33) (18 - 19)  SpO2: 95% (10 Apr 2020 22:33) (92% - 96%)    CONSTITUTIONAL: NAD, well-developed, well-groomed  ENMT: Moist oral mucosa, no pharyngeal injection or exudates; normal dentition  RESPIRATORY: Normal respiratory effort; lungs are clear to auscultation bilaterally  CARDIOVASCULAR: Regular rate and rhythm, normal S1 and S2, no murmur/rub/gallop; No lower extremity edema; Peripheral pulses are 2+ bilaterally  ABDOMEN: Nontender to palpation, normoactive bowel sounds, no rebound/guarding; No hepatosplenomegaly  PSYCH: A+O to person, place, and time; affect appropriate  NEUROLOGY: CN 2-12 are intact and symmetric; no gross sensory deficits   SKIN: No rashes; no palpable lesions    LABS:                        14.6   13.77 )-----------( 445      ( 10 Apr 2020 04:49 )             43.9     04-10    139  |  101  |  17  ----------------------------<  90  4.3   |  27  |  1.04    Ca    8.8      10 Apr 2020 04:49  Phos  3.1     04-10  Mg     2.4     04-10    TPro  7.0  /  Alb  3.2<L>  /  TBili  0.5  /  DBili  x   /  AST  30  /  ALT  38  /  AlkPhos  72  04-10              COVID-19 PCR: Detected (06 Apr 2020 12:58)      RADIOLOGY & ADDITIONAL TESTS:  Imaging from Last 24 Hours:    Electrocardiogram/QTc Interval:    COORDINATION OF CARE:  Care Discussed with Consultants/Other Providers: Medicine Progress Note    ***************************************************************  CONTACT INFO  Nick Mccall M.D., PGY-2  Pager: [phone] ([facility]) / [phone] ([facility])    ***************************************************************    Patient is a 44y old  Male who presents with a chief complaint of respiratory distress, hypoxia (10 Apr 2020 08:13)      SUBJECTIVE / OVERNIGHT EVENTS: Patient seen and examined at bedside. Vital signs stable overnight. On 6L NC with Spo2>92%. Feels better today and has a better appetite.  Patient denies headache, dizziness, chest/abd pain, shortness of breath. ROS negative unless otherwise stated.     ADDITIONAL REVIEW OF SYSTEMS:    MEDICATIONS  (STANDING):  amLODIPine   Tablet 10 milliGRAM(s) Oral daily  anakinra Injectable 100 milliGRAM(s) SubCutaneous <User Schedule>  enoxaparin Injectable 40 milliGRAM(s) SubCutaneous two times a day    MEDICATIONS  (PRN):  acetaminophen   Tablet .. 650 milliGRAM(s) Oral every 4 hours PRN Temp greater or equal to 38.5C (101.3F)  acetaminophen  Suppository .. 650 milliGRAM(s) Rectal every 4 hours PRN Temp greater or equal to 38.5C (101.3F)  ALBUTerol    90 MICROgram(s) HFA Inhaler 2 Puff(s) Inhalation every 6 hours PRN Shortness of Breath  aluminum hydroxide/magnesium hydroxide/simethicone Suspension 30 milliLiter(s) Oral every 6 hours PRN Dyspepsia    CAPILLARY BLOOD GLUCOSE        I&O's Summary    10 Apr 2020 07:01  -  11 Apr 2020 07:00  --------------------------------------------------------  IN: 0 mL / OUT: 440 mL / NET: -440 mL        PHYSICAL EXAM:  Vital Signs Last 24 Hrs  T(C): 36.6 (10 Apr 2020 22:33), Max: 37.1 (10 Apr 2020 12:35)  T(F): 97.8 (10 Apr 2020 22:33), Max: 98.8 (10 Apr 2020 12:35)  HR: 92 (11 Apr 2020 05:50) (90 - 92)  BP: 121/86 (11 Apr 2020 05:50) (121/86 - 130/90)  BP(mean): --  RR: 19 (10 Apr 2020 22:33) (18 - 19)  SpO2: 95% (10 Apr 2020 22:33) (92% - 96%)    CONSTITUTIONAL: NAD, well-developed, well-groomed  ENMT: Moist oral mucosa, no pharyngeal injection or exudates; normal dentition  RESPIRATORY: Normal respiratory effort; lungs are clear to auscultation bilaterally  CARDIOVASCULAR: Regular rate and rhythm, normal S1 and S2, no murmur/rub/gallop; No lower extremity edema; Peripheral pulses are 2+ bilaterally  ABDOMEN: Nontender to palpation, normoactive bowel sounds, no rebound/guarding; No hepatosplenomegaly  PSYCH: A+O to person, place, and time; affect appropriate  NEUROLOGY: CN 2-12 are intact and symmetric; no gross sensory deficits   SKIN: No rashes; no palpable lesions    LABS:                        14.6   13.77 )-----------( 445      ( 10 Apr 2020 04:49 )             43.9     04-10    139  |  101  |  17  ----------------------------<  90  4.3   |  27  |  1.04    Ca    8.8      10 Apr 2020 04:49  Phos  3.1     04-10  Mg     2.4     04-10    TPro  7.0  /  Alb  3.2<L>  /  TBili  0.5  /  DBili  x   /  AST  30  /  ALT  38  /  AlkPhos  72  04-10              COVID-19 PCR: Detected (06 Apr 2020 12:58)      RADIOLOGY & ADDITIONAL TESTS:  Imaging from Last 24 Hours:    Electrocardiogram/QTc Interval:    COORDINATION OF CARE:  Care Discussed with Consultants/Other Providers:

## 2020-04-11 NOTE — PROGRESS NOTE ADULT - ASSESSMENT
44M w/ HTN p/w 1 week of fevers and weakness, found to be hypoxic in UC. COVID-19 +.    Plaquenil started 4-6  Solumedrol started 4-6  Given K x 1 4/6 COVID-19 Test: +4/6  Oxygen Support: NRB    Continue exhale incentive spirometry    Prognostic labs: Ferritin 510; CRP 70  QTC: 429  Hydroxychloroquine: started 4/6  Steroids: started 4/6  Anakinra:  started 4-8    Problem/Plan:  #Hypoxic respiratory failure 2/2 COVID-19 Infection  - Weaned off NRB now on NC  - HCQ ending 4/11  - steroids ended 4/9  - c/w Anakinra  - Tylenol PRN fever  - albuterol PRN SOB    #Chronic Medical Problems:  - HTN: c/w amlodipine    #FEN/PPx  - lovenox SubQ BID  - regular diet    #FULL CODE

## 2020-04-12 LAB
ALBUMIN SERPL ELPH-MCNC: 3 G/DL — LOW (ref 3.3–5)
ALP SERPL-CCNC: 63 U/L — SIGNIFICANT CHANGE UP (ref 40–120)
ALT FLD-CCNC: 46 U/L — HIGH (ref 4–41)
ANION GAP SERPL CALC-SCNC: 13 MMO/L — SIGNIFICANT CHANGE UP (ref 7–14)
AST SERPL-CCNC: 38 U/L — SIGNIFICANT CHANGE UP (ref 4–40)
BASOPHILS # BLD AUTO: 0.09 K/UL — SIGNIFICANT CHANGE UP (ref 0–0.2)
BASOPHILS NFR BLD AUTO: 1.3 % — SIGNIFICANT CHANGE UP (ref 0–2)
BILIRUB SERPL-MCNC: 0.4 MG/DL — SIGNIFICANT CHANGE UP (ref 0.2–1.2)
BUN SERPL-MCNC: 14 MG/DL — SIGNIFICANT CHANGE UP (ref 7–23)
CALCIUM SERPL-MCNC: 8.7 MG/DL — SIGNIFICANT CHANGE UP (ref 8.4–10.5)
CHLORIDE SERPL-SCNC: 97 MMOL/L — LOW (ref 98–107)
CO2 SERPL-SCNC: 23 MMOL/L — SIGNIFICANT CHANGE UP (ref 22–31)
CREAT SERPL-MCNC: 0.96 MG/DL — SIGNIFICANT CHANGE UP (ref 0.5–1.3)
CRP SERPL-MCNC: 11.4 MG/L — HIGH
EOSINOPHIL # BLD AUTO: 0.13 K/UL — SIGNIFICANT CHANGE UP (ref 0–0.5)
EOSINOPHIL NFR BLD AUTO: 1.9 % — SIGNIFICANT CHANGE UP (ref 0–6)
FERRITIN SERPL-MCNC: 524.2 NG/ML — HIGH (ref 30–400)
GLUCOSE SERPL-MCNC: 95 MG/DL — SIGNIFICANT CHANGE UP (ref 70–99)
HCT VFR BLD CALC: 41.7 % — SIGNIFICANT CHANGE UP (ref 39–50)
HGB BLD-MCNC: 13.7 G/DL — SIGNIFICANT CHANGE UP (ref 13–17)
IMM GRANULOCYTES NFR BLD AUTO: 7.3 % — HIGH (ref 0–1.5)
LYMPHOCYTES # BLD AUTO: 1.62 K/UL — SIGNIFICANT CHANGE UP (ref 1–3.3)
LYMPHOCYTES # BLD AUTO: 23.8 % — SIGNIFICANT CHANGE UP (ref 13–44)
MAGNESIUM SERPL-MCNC: 2.3 MG/DL — SIGNIFICANT CHANGE UP (ref 1.6–2.6)
MCHC RBC-ENTMCNC: 29.4 PG — SIGNIFICANT CHANGE UP (ref 27–34)
MCHC RBC-ENTMCNC: 32.9 % — SIGNIFICANT CHANGE UP (ref 32–36)
MCV RBC AUTO: 89.5 FL — SIGNIFICANT CHANGE UP (ref 80–100)
MONOCYTES # BLD AUTO: 0.63 K/UL — SIGNIFICANT CHANGE UP (ref 0–0.9)
MONOCYTES NFR BLD AUTO: 9.3 % — SIGNIFICANT CHANGE UP (ref 2–14)
NEUTROPHILS # BLD AUTO: 3.84 K/UL — SIGNIFICANT CHANGE UP (ref 1.8–7.4)
NEUTROPHILS NFR BLD AUTO: 56.4 % — SIGNIFICANT CHANGE UP (ref 43–77)
NRBC # FLD: 0.02 K/UL — SIGNIFICANT CHANGE UP (ref 0–0)
PHOSPHATE SERPL-MCNC: 3.7 MG/DL — SIGNIFICANT CHANGE UP (ref 2.5–4.5)
PLATELET # BLD AUTO: 484 K/UL — HIGH (ref 150–400)
PMV BLD: 8.5 FL — SIGNIFICANT CHANGE UP (ref 7–13)
POTASSIUM SERPL-MCNC: 3.9 MMOL/L — SIGNIFICANT CHANGE UP (ref 3.5–5.3)
POTASSIUM SERPL-SCNC: 3.9 MMOL/L — SIGNIFICANT CHANGE UP (ref 3.5–5.3)
PROCALCITONIN SERPL-MCNC: 0.1 NG/ML — SIGNIFICANT CHANGE UP (ref 0.02–0.1)
PROT SERPL-MCNC: 6.3 G/DL — SIGNIFICANT CHANGE UP (ref 6–8.3)
RBC # BLD: 4.66 M/UL — SIGNIFICANT CHANGE UP (ref 4.2–5.8)
RBC # FLD: 13.3 % — SIGNIFICANT CHANGE UP (ref 10.3–14.5)
SODIUM SERPL-SCNC: 133 MMOL/L — LOW (ref 135–145)
WBC # BLD: 6.81 K/UL — SIGNIFICANT CHANGE UP (ref 3.8–10.5)
WBC # FLD AUTO: 6.81 K/UL — SIGNIFICANT CHANGE UP (ref 3.8–10.5)

## 2020-04-12 PROCEDURE — 99233 SBSQ HOSP IP/OBS HIGH 50: CPT

## 2020-04-12 RX ADMIN — AMLODIPINE BESYLATE 10 MILLIGRAM(S): 2.5 TABLET ORAL at 04:42

## 2020-04-12 RX ADMIN — Medication 100 MILLIGRAM(S): at 11:50

## 2020-04-12 RX ADMIN — ENOXAPARIN SODIUM 40 MILLIGRAM(S): 100 INJECTION SUBCUTANEOUS at 18:03

## 2020-04-12 RX ADMIN — ENOXAPARIN SODIUM 40 MILLIGRAM(S): 100 INJECTION SUBCUTANEOUS at 04:41

## 2020-04-12 RX ADMIN — Medication 100 MILLIGRAM(S): at 04:41

## 2020-04-12 NOTE — PROGRESS NOTE ADULT - SUBJECTIVE AND OBJECTIVE BOX
Patient is a 44y old  Male who presents with a chief complaint of respiratory distress, hypoxia (11 Apr 2020 07:49)    Contact:  [facility] Pager: [phone]  [facility] Pager: [phone]    SUBJECTIVE / OVERNIGHT EVENTS:  No acute events reported overnight. Pt seen and examined at bedside.     **note in progress  MEDICATIONS  (STANDING):  amLODIPine   Tablet 10 milliGRAM(s) Oral daily  enoxaparin Injectable 40 milliGRAM(s) SubCutaneous two times a day    MEDICATIONS  (PRN):  acetaminophen   Tablet .. 650 milliGRAM(s) Oral every 4 hours PRN Temp greater or equal to 38.5C (101.3F)  acetaminophen  Suppository .. 650 milliGRAM(s) Rectal every 4 hours PRN Temp greater or equal to 38.5C (101.3F)  ALBUTerol    90 MICROgram(s) HFA Inhaler 2 Puff(s) Inhalation every 6 hours PRN Shortness of Breath  aluminum hydroxide/magnesium hydroxide/simethicone Suspension 30 milliLiter(s) Oral every 6 hours PRN Dyspepsia      Vital Signs Last 24 Hrs  T(C): 37 (12 Apr 2020 04:22), Max: 37 (12 Apr 2020 04:22)  T(F): 98.6 (12 Apr 2020 04:22), Max: 98.6 (12 Apr 2020 04:22)  HR: 80 (12 Apr 2020 04:22) (80 - 90)  BP: 115/81 (12 Apr 2020 04:22) (115/81 - 122/74)  BP(mean): --  RR: 18 (12 Apr 2020 04:22) (18 - 19)  SpO2: 96% (12 Apr 2020 04:22) (92% - 96%)    CAPILLARY BLOOD GLUCOSE        I&O's Summary      PHYSICAL EXAM:  GENERAL: NAD, well-developed  HEAD:  Atraumatic, Normocephalic  EYES: EOMI, PERRLA, conjunctiva and sclera clear  NECK: Supple, No JVD  CHEST/LUNG: Clear to auscultation bilaterally; No wheeze  HEART: Regular rate and rhythm; No murmurs, rubs, or gallops  ABDOMEN: Soft, Nontender, Nondistended; Bowel sounds present  EXTREMITIES:  2+ Peripheral Pulses, No clubbing, cyanosis, or edema  PSYCH: AAOx3  NEUROLOGY: non-focal  SKIN: No rashes or lesions    LABS:                        13.7   6.81  )-----------( 484      ( 12 Apr 2020 04:10 )             41.7     Auto Eosinophil # 0.13  / Auto Eosinophil % 1.9   / Auto Neutrophil # 3.84  / Auto Neutrophil % 56.4  / BANDS % x                            15.0   8.64  )-----------( 461      ( 11 Apr 2020 06:22 )             45.7     Auto Eosinophil # 0.05  / Auto Eosinophil % 0.6   / Auto Neutrophil # 5.77  / Auto Neutrophil % 66.8  / BANDS % x        04-12    133<L>  |  97<L>  |  14  ----------------------------<  95  3.9   |  23  |  0.96  04-11    134<L>  |  98  |  12  ----------------------------<  81  4.5   |  23  |  0.94    Ca    8.7      12 Apr 2020 04:10  Mg     2.3     04-12  Phos  3.7     04-12  TPro  6.3  /  Alb  3.0<L>  /  TBili  0.4  /  DBili  x   /  AST  38  /  ALT  46<H>  /  AlkPhos  63  04-12  TPro  6.7  /  Alb  3.2<L>  /  TBili  0.5  /  DBili  x   /  AST  35  /  ALT  37  /  AlkPhos  70  04-11                RESPIRATORY  VENT:    ABG:     VBG:     RADIOLOGY & ADDITIONAL TESTS:  (Imaging Personally Reviewed)    Consultant(s) Notes Reviewed:      Care Discussed with Consultants/Other Providers: Patient is a 44y old  Male who presents with a chief complaint of respiratory distress, hypoxia (11 Apr 2020 07:49)    Contact:  [facility] Pager: [phone]  [facility] Pager: [phone]    SUBJECTIVE / OVERNIGHT EVENTS:  No acute events reported overnight. Pt seen and examined at bedside.  Denies ROS unless noted.      MEDICATIONS  (STANDING):  amLODIPine   Tablet 10 milliGRAM(s) Oral daily  enoxaparin Injectable 40 milliGRAM(s) SubCutaneous two times a day    MEDICATIONS  (PRN):  acetaminophen   Tablet .. 650 milliGRAM(s) Oral every 4 hours PRN Temp greater or equal to 38.5C (101.3F)  acetaminophen  Suppository .. 650 milliGRAM(s) Rectal every 4 hours PRN Temp greater or equal to 38.5C (101.3F)  ALBUTerol    90 MICROgram(s) HFA Inhaler 2 Puff(s) Inhalation every 6 hours PRN Shortness of Breath  aluminum hydroxide/magnesium hydroxide/simethicone Suspension 30 milliLiter(s) Oral every 6 hours PRN Dyspepsia      Vital Signs Last 24 Hrs  T(C): 37 (12 Apr 2020 04:22), Max: 37 (12 Apr 2020 04:22)  T(F): 98.6 (12 Apr 2020 04:22), Max: 98.6 (12 Apr 2020 04:22)  HR: 80 (12 Apr 2020 04:22) (80 - 90)  BP: 115/81 (12 Apr 2020 04:22) (115/81 - 122/74)  BP(mean): --  RR: 18 (12 Apr 2020 04:22) (18 - 19)  SpO2: 96% (12 Apr 2020 04:22) (92% - 96%)    CAPILLARY BLOOD GLUCOSE        I&O's Summary      PHYSICAL EXAM:  GENERAL: NAD, well-developed  HEAD:  Atraumatic, Normocephalic  EYES: EOMI, PERRLA, conjunctiva and sclera clear  NECK: Supple, No JVD  CHEST/LUNG: Clear to auscultation bilaterally; No wheeze  HEART: Regular rate and rhythm; No murmurs, rubs, or gallops  ABDOMEN: Soft, Nontender, Nondistended; Bowel sounds present  EXTREMITIES:  2+ Peripheral Pulses, No clubbing, cyanosis, or edema  PSYCH: AAOx3  NEUROLOGY: non-focal  SKIN: No rashes or lesions    LABS:                        13.7   6.81  )-----------( 484      ( 12 Apr 2020 04:10 )             41.7     Auto Eosinophil # 0.13  / Auto Eosinophil % 1.9   / Auto Neutrophil # 3.84  / Auto Neutrophil % 56.4  / BANDS % x                            15.0   8.64  )-----------( 461      ( 11 Apr 2020 06:22 )             45.7     Auto Eosinophil # 0.05  / Auto Eosinophil % 0.6   / Auto Neutrophil # 5.77  / Auto Neutrophil % 66.8  / BANDS % x        04-12    133<L>  |  97<L>  |  14  ----------------------------<  95  3.9   |  23  |  0.96  04-11    134<L>  |  98  |  12  ----------------------------<  81  4.5   |  23  |  0.94    Ca    8.7      12 Apr 2020 04:10  Mg     2.3     04-12  Phos  3.7     04-12  TPro  6.3  /  Alb  3.0<L>  /  TBili  0.4  /  DBili  x   /  AST  38  /  ALT  46<H>  /  AlkPhos  63  04-12  TPro  6.7  /  Alb  3.2<L>  /  TBili  0.5  /  DBili  x   /  AST  35  /  ALT  37  /  AlkPhos  70  04-11                RESPIRATORY  VENT:    ABG:     VBG:     RADIOLOGY & ADDITIONAL TESTS:  (Imaging Personally Reviewed)    Consultant(s) Notes Reviewed:      Care Discussed with Consultants/Other Providers:

## 2020-04-13 LAB
ALBUMIN SERPL ELPH-MCNC: 3.6 G/DL — SIGNIFICANT CHANGE UP (ref 3.3–5)
ALP SERPL-CCNC: 72 U/L — SIGNIFICANT CHANGE UP (ref 40–120)
ALT FLD-CCNC: 99 U/L — HIGH (ref 4–41)
ANION GAP SERPL CALC-SCNC: 12 MMO/L — SIGNIFICANT CHANGE UP (ref 7–14)
ANISOCYTOSIS BLD QL: SLIGHT — SIGNIFICANT CHANGE UP
AST SERPL-CCNC: 61 U/L — HIGH (ref 4–40)
BASOPHILS # BLD AUTO: 0.02 K/UL — SIGNIFICANT CHANGE UP (ref 0–0.2)
BASOPHILS NFR BLD AUTO: 0.3 % — SIGNIFICANT CHANGE UP (ref 0–2)
BASOPHILS NFR SPEC: 0 % — SIGNIFICANT CHANGE UP (ref 0–2)
BILIRUB SERPL-MCNC: 0.4 MG/DL — SIGNIFICANT CHANGE UP (ref 0.2–1.2)
BLASTS # FLD: 0 % — SIGNIFICANT CHANGE UP (ref 0–0)
BUN SERPL-MCNC: 15 MG/DL — SIGNIFICANT CHANGE UP (ref 7–23)
CALCIUM SERPL-MCNC: 9.4 MG/DL — SIGNIFICANT CHANGE UP (ref 8.4–10.5)
CHLORIDE SERPL-SCNC: 99 MMOL/L — SIGNIFICANT CHANGE UP (ref 98–107)
CO2 SERPL-SCNC: 27 MMOL/L — SIGNIFICANT CHANGE UP (ref 22–31)
CREAT SERPL-MCNC: 1.11 MG/DL — SIGNIFICANT CHANGE UP (ref 0.5–1.3)
EOSINOPHIL # BLD AUTO: 0.13 K/UL — SIGNIFICANT CHANGE UP (ref 0–0.5)
EOSINOPHIL NFR BLD AUTO: 1.8 % — SIGNIFICANT CHANGE UP (ref 0–6)
EOSINOPHIL NFR FLD: 0 % — SIGNIFICANT CHANGE UP (ref 0–6)
GIANT PLATELETS BLD QL SMEAR: PRESENT — SIGNIFICANT CHANGE UP
GLUCOSE SERPL-MCNC: 100 MG/DL — HIGH (ref 70–99)
HCT VFR BLD CALC: 48.2 % — SIGNIFICANT CHANGE UP (ref 39–50)
HGB BLD-MCNC: 15.8 G/DL — SIGNIFICANT CHANGE UP (ref 13–17)
IMM GRANULOCYTES NFR BLD AUTO: 11 % — HIGH (ref 0–1.5)
LYMPHOCYTES # BLD AUTO: 1.99 K/UL — SIGNIFICANT CHANGE UP (ref 1–3.3)
LYMPHOCYTES # BLD AUTO: 27.5 % — SIGNIFICANT CHANGE UP (ref 13–44)
LYMPHOCYTES NFR SPEC AUTO: 24.3 % — SIGNIFICANT CHANGE UP (ref 13–44)
MACROCYTES BLD QL: SLIGHT — SIGNIFICANT CHANGE UP
MAGNESIUM SERPL-MCNC: 2.5 MG/DL — SIGNIFICANT CHANGE UP (ref 1.6–2.6)
MCHC RBC-ENTMCNC: 29.4 PG — SIGNIFICANT CHANGE UP (ref 27–34)
MCHC RBC-ENTMCNC: 32.8 % — SIGNIFICANT CHANGE UP (ref 32–36)
MCV RBC AUTO: 89.6 FL — SIGNIFICANT CHANGE UP (ref 80–100)
METAMYELOCYTES # FLD: 0 % — SIGNIFICANT CHANGE UP (ref 0–1)
MONOCYTES # BLD AUTO: 0.63 K/UL — SIGNIFICANT CHANGE UP (ref 0–0.9)
MONOCYTES NFR BLD AUTO: 8.7 % — SIGNIFICANT CHANGE UP (ref 2–14)
MONOCYTES NFR BLD: 8.4 % — SIGNIFICANT CHANGE UP (ref 2–9)
MYELOCYTES NFR BLD: 0 % — SIGNIFICANT CHANGE UP (ref 0–0)
NEUTROPHIL AB SER-ACNC: 57 % — SIGNIFICANT CHANGE UP (ref 43–77)
NEUTROPHILS # BLD AUTO: 3.67 K/UL — SIGNIFICANT CHANGE UP (ref 1.8–7.4)
NEUTROPHILS NFR BLD AUTO: 50.7 % — SIGNIFICANT CHANGE UP (ref 43–77)
NEUTS BAND # BLD: 0 % — SIGNIFICANT CHANGE UP (ref 0–6)
NRBC # FLD: 0 K/UL — SIGNIFICANT CHANGE UP (ref 0–0)
OTHER - HEMATOLOGY %: 0 — SIGNIFICANT CHANGE UP
PHOSPHATE SERPL-MCNC: 3.2 MG/DL — SIGNIFICANT CHANGE UP (ref 2.5–4.5)
PLATELET # BLD AUTO: 579 K/UL — HIGH (ref 150–400)
PLATELET COUNT - ESTIMATE: SIGNIFICANT CHANGE UP
PMV BLD: 8.4 FL — SIGNIFICANT CHANGE UP (ref 7–13)
POIKILOCYTOSIS BLD QL AUTO: SLIGHT — SIGNIFICANT CHANGE UP
POTASSIUM SERPL-MCNC: 3.9 MMOL/L — SIGNIFICANT CHANGE UP (ref 3.5–5.3)
POTASSIUM SERPL-SCNC: 3.9 MMOL/L — SIGNIFICANT CHANGE UP (ref 3.5–5.3)
PROMYELOCYTES # FLD: 0 % — SIGNIFICANT CHANGE UP (ref 0–0)
PROT SERPL-MCNC: 7.5 G/DL — SIGNIFICANT CHANGE UP (ref 6–8.3)
RBC # BLD: 5.38 M/UL — SIGNIFICANT CHANGE UP (ref 4.2–5.8)
RBC # FLD: 13.2 % — SIGNIFICANT CHANGE UP (ref 10.3–14.5)
SMUDGE CELLS # BLD: PRESENT — SIGNIFICANT CHANGE UP
SODIUM SERPL-SCNC: 138 MMOL/L — SIGNIFICANT CHANGE UP (ref 135–145)
VARIANT LYMPHS # BLD: 10.3 % — SIGNIFICANT CHANGE UP
WBC # BLD: 7.24 K/UL — SIGNIFICANT CHANGE UP (ref 3.8–10.5)
WBC # FLD AUTO: 7.24 K/UL — SIGNIFICANT CHANGE UP (ref 3.8–10.5)

## 2020-04-13 PROCEDURE — 99233 SBSQ HOSP IP/OBS HIGH 50: CPT

## 2020-04-13 RX ADMIN — AMLODIPINE BESYLATE 10 MILLIGRAM(S): 2.5 TABLET ORAL at 05:18

## 2020-04-13 RX ADMIN — ENOXAPARIN SODIUM 40 MILLIGRAM(S): 100 INJECTION SUBCUTANEOUS at 18:23

## 2020-04-13 RX ADMIN — ENOXAPARIN SODIUM 40 MILLIGRAM(S): 100 INJECTION SUBCUTANEOUS at 05:19

## 2020-04-13 NOTE — PROGRESS NOTE ADULT - ASSESSMENT
44M w/ HTN p/w 1 week of fevers and weakness, found to be hypoxic in UC. COVID-19 +.    Plaquenil started 4-6  Solumedrol started 4-6  Given K x 1 4/6 COVID-19 Test: +4/6  Oxygen Support: NRB    Continue exhale incentive spirometry    Prognostic labs: Ferritin 510; CRP 70  QTC: 429  Hydroxychloroquine: started 4/6  Steroids: started 4/6  Anakinra:  started 4-8    Problem/Plan:  #Hypoxic respiratory failure 2/2 COVID-19 Infection  - Weaned off NRB now on NC  - HCQ ending 4/11  - steroids ended 4/9 and s/p Anakinra  - Tylenol PRN fever  - albuterol PRN SOB    #Chronic Medical Problems:  - HTN: c/w amlodipine    #FEN/PPx  - lovenox SubQ BID  - regular diet    #FULL CODE 44M w/ HTN p/w 1 week of fevers and weakness, found to be hypoxic in UC. COVID-19 +.    COVID-19 Test: +4/6  Prognostic labs: Ferritin 510; CRP 70  QTC: 429  Hydroxychloroquine: started 4/6  Steroids: started 4/6  Anakinra:  started 4-8    Problem/Plan:  #Hypoxic respiratory failure 2/2 COVID-19 Infection  - Weaned off NRB now on NC  - HCQ ending 4/11  - steroids ended 4/9 and s/p Anakinra  - Tylenol PRN fever  - albuterol PRN SOB    #Chronic Medical Problems:  - HTN: c/w amlodipine    #FEN/PPx  - lovenox SubQ BID  - regular diet    #FULL CODE 44M w/ HTN p/w 1 week of fevers and weakness, found to be hypoxic in UC. COVID-19 +.    COVID-19 Test: +4/6  Prognostic labs: Ferritin 510; CRP 70  QTC: 429  Hydroxychloroquine: started 4/6  Steroids: started 4/6  Anakinra: started 4-8  F/u inflammatory markers in AM    Problem/Plan:  #Hypoxic respiratory failure 2/2 COVID-19 Infection  - Weaned off NRB and NC  - HCQ ending 4/11  - steroids ended 4/9 and s/p Anakinra  - Tylenol PRN fever  - albuterol PRN SOB    #Chronic Medical Problems:  - HTN: c/w amlodipine    #FEN/PPx  - lovenox SubQ BID  - regular diet    #FULL CODE

## 2020-04-13 NOTE — PROGRESS NOTE ADULT - SUBJECTIVE AND OBJECTIVE BOX
Dr. Viraj Alcantar  Internal Medicine PGY-2  Pager# [phone]    Patient is a 44y old  Male who presents with a chief complaint of respiratory distress, hypoxia (12 Apr 2020 12:35)      SUBJECTIVE / OVERNIGHT EVENTS: There were no acute events overnight. Patient was on NC 2L and satting 98%.     MEDICATIONS  (STANDING):  amLODIPine   Tablet 10 milliGRAM(s) Oral daily  enoxaparin Injectable 40 milliGRAM(s) SubCutaneous two times a day    MEDICATIONS  (PRN):  acetaminophen   Tablet .. 650 milliGRAM(s) Oral every 4 hours PRN Temp greater or equal to 38.5C (101.3F)  acetaminophen  Suppository .. 650 milliGRAM(s) Rectal every 4 hours PRN Temp greater or equal to 38.5C (101.3F)  ALBUTerol    90 MICROgram(s) HFA Inhaler 2 Puff(s) Inhalation every 6 hours PRN Shortness of Breath  aluminum hydroxide/magnesium hydroxide/simethicone Suspension 30 milliLiter(s) Oral every 6 hours PRN Dyspepsia      Vital Signs Last 24 Hrs  T(C): 37.1 (13 Apr 2020 05:17), Max: 37.1 (13 Apr 2020 05:17)  T(F): 98.7 (13 Apr 2020 05:17), Max: 98.7 (13 Apr 2020 05:17)  HR: 80 (13 Apr 2020 05:17) (80 - 95)  BP: 127/95 (13 Apr 2020 05:17) (121/88 - 145/92)  BP(mean): --  RR: 18 (13 Apr 2020 05:17) (18 - 18)  SpO2: 98% (13 Apr 2020 05:17) (79% - 98%)  CAPILLARY BLOOD GLUCOSE        I&O's Summary      PHYSICAL EXAM:  GENERAL: NAD, well-developed  HEAD:  Atraumatic, Normocephalic  EYES: EOMI, PERRLA, conjunctiva and sclera clear  NECK: Supple, No JVD  CHEST/LUNG: Clear to auscultation bilaterally; No wheeze  HEART: Regular rate and rhythm; No murmurs, rubs, or gallops  ABDOMEN: Soft, Nontender, Nondistended; Bowel sounds present  EXTREMITIES:  2+ Peripheral Pulses, No clubbing, cyanosis, or edema  PSYCH: AAOx3  NEUROLOGY: non-focal  SKIN: No rashes or lesions    LABS:                        15.8   7.24  )-----------( 579      ( 13 Apr 2020 04:19 )             48.2     04-13    138  |  99  |  15  ----------------------------<  100<H>  3.9   |  27  |  1.11    Ca    9.4      13 Apr 2020 04:19  Phos  3.2     04-13  Mg     2.5     04-13    TPro  7.5  /  Alb  3.6  /  TBili  0.4  /  DBili  x   /  AST  61<H>  /  ALT  99<H>  /  AlkPhos  72  04-13              RADIOLOGY & ADDITIONAL TESTS:    Imaging Personally Reviewed:    Consultant(s) Notes Reviewed:      Care Discussed with Consultants/Other Providers: Dr. Viraj Alcantar  Internal Medicine PGY-2  Pager# [phone]    Patient is a 44y old  Male who presents with a chief complaint of respiratory distress, hypoxia (12 Apr 2020 12:35)      SUBJECTIVE / OVERNIGHT EVENTS: There were no acute events overnight. Patient was on NC 2L and satting 98%. Improved no SOB.	    MEDICATIONS  (STANDING):  amLODIPine   Tablet 10 milliGRAM(s) Oral daily  enoxaparin Injectable 40 milliGRAM(s) SubCutaneous two times a day    MEDICATIONS  (PRN):  acetaminophen   Tablet .. 650 milliGRAM(s) Oral every 4 hours PRN Temp greater or equal to 38.5C (101.3F)  acetaminophen  Suppository .. 650 milliGRAM(s) Rectal every 4 hours PRN Temp greater or equal to 38.5C (101.3F)  ALBUTerol    90 MICROgram(s) HFA Inhaler 2 Puff(s) Inhalation every 6 hours PRN Shortness of Breath  aluminum hydroxide/magnesium hydroxide/simethicone Suspension 30 milliLiter(s) Oral every 6 hours PRN Dyspepsia      Vital Signs Last 24 Hrs  T(C): 37.1 (13 Apr 2020 05:17), Max: 37.1 (13 Apr 2020 05:17)  T(F): 98.7 (13 Apr 2020 05:17), Max: 98.7 (13 Apr 2020 05:17)  HR: 80 (13 Apr 2020 05:17) (80 - 95)  BP: 127/95 (13 Apr 2020 05:17) (121/88 - 145/92)  BP(mean): --  RR: 18 (13 Apr 2020 05:17) (18 - 18)  SpO2: 98% (13 Apr 2020 05:17) (79% - 98%)  CAPILLARY BLOOD GLUCOSE        I&O's Summary      PHYSICAL EXAM:  GENERAL: NAD, well-developed  HEAD:  Atraumatic, Normocephalic  EYES: EOMI, PERRLA, conjunctiva and sclera clear  NECK: Supple, No JVD  CHEST/LUNG: Clear to auscultation bilaterally; No wheeze  HEART: Regular rate and rhythm; No murmurs, rubs, or gallops  ABDOMEN: Soft, Nontender, Nondistended; Bowel sounds present  EXTREMITIES:  2+ Peripheral Pulses, No clubbing, cyanosis, or edema  PSYCH: AAOx3  NEUROLOGY: non-focal  SKIN: No rashes or lesions    LABS:                        15.8   7.24  )-----------( 579      ( 13 Apr 2020 04:19 )             48.2     04-13    138  |  99  |  15  ----------------------------<  100<H>  3.9   |  27  |  1.11    Ca    9.4      13 Apr 2020 04:19  Phos  3.2     04-13  Mg     2.5     04-13    TPro  7.5  /  Alb  3.6  /  TBili  0.4  /  DBili  x   /  AST  61<H>  /  ALT  99<H>  /  AlkPhos  72  04-13              RADIOLOGY & ADDITIONAL TESTS:    Imaging Personally Reviewed:    Consultant(s) Notes Reviewed:      Care Discussed with Consultants/Other Providers: Dr. Viraj Alcantar  Internal Medicine PGY-2  Pager# [phone]    Patient is a 44y old  Male who presents with a chief complaint of respiratory distress, hypoxia (12 Apr 2020 12:35)      SUBJECTIVE / OVERNIGHT EVENTS: There were no acute events overnight. Patient was on NC 2L and satting 98%. Improved no SOB. Patient had one episode of desaturation to 79% when ambulating. 	    MEDICATIONS  (STANDING):  amLODIPine   Tablet 10 milliGRAM(s) Oral daily  enoxaparin Injectable 40 milliGRAM(s) SubCutaneous two times a day    MEDICATIONS  (PRN):  acetaminophen   Tablet .. 650 milliGRAM(s) Oral every 4 hours PRN Temp greater or equal to 38.5C (101.3F)  acetaminophen  Suppository .. 650 milliGRAM(s) Rectal every 4 hours PRN Temp greater or equal to 38.5C (101.3F)  ALBUTerol    90 MICROgram(s) HFA Inhaler 2 Puff(s) Inhalation every 6 hours PRN Shortness of Breath  aluminum hydroxide/magnesium hydroxide/simethicone Suspension 30 milliLiter(s) Oral every 6 hours PRN Dyspepsia      Vital Signs Last 24 Hrs  T(C): 37.1 (13 Apr 2020 05:17), Max: 37.1 (13 Apr 2020 05:17)  T(F): 98.7 (13 Apr 2020 05:17), Max: 98.7 (13 Apr 2020 05:17)  HR: 80 (13 Apr 2020 05:17) (80 - 95)  BP: 127/95 (13 Apr 2020 05:17) (121/88 - 145/92)  BP(mean): --  RR: 18 (13 Apr 2020 05:17) (18 - 18)  SpO2: 98% (13 Apr 2020 05:17) (79% - 98%)  CAPILLARY BLOOD GLUCOSE        I&O's Summary      PHYSICAL EXAM:  GENERAL: NAD, well-developed  HEAD:  Atraumatic, Normocephalic  EYES: EOMI, PERRLA, conjunctiva and sclera clear  NECK: Supple, No JVD  CHEST/LUNG: Clear to auscultation bilaterally; No wheeze  HEART: Regular rate and rhythm; No murmurs, rubs, or gallops  ABDOMEN: Soft, Nontender, Nondistended; Bowel sounds present  EXTREMITIES:  2+ Peripheral Pulses, No clubbing, cyanosis, or edema  PSYCH: AAOx3  NEUROLOGY: non-focal  SKIN: No rashes or lesions    LABS:                        15.8   7.24  )-----------( 579      ( 13 Apr 2020 04:19 )             48.2     04-13    138  |  99  |  15  ----------------------------<  100<H>  3.9   |  27  |  1.11    Ca    9.4      13 Apr 2020 04:19  Phos  3.2     04-13  Mg     2.5     04-13    TPro  7.5  /  Alb  3.6  /  TBili  0.4  /  DBili  x   /  AST  61<H>  /  ALT  99<H>  /  AlkPhos  72  04-13              RADIOLOGY & ADDITIONAL TESTS:    Imaging Personally Reviewed:    Consultant(s) Notes Reviewed:      Care Discussed with Consultants/Other Providers:

## 2020-04-14 ENCOUNTER — TRANSCRIPTION ENCOUNTER (OUTPATIENT)
Age: 45
End: 2020-04-14

## 2020-04-14 VITALS — RESPIRATION RATE: 20 BRPM | OXYGEN SATURATION: 92 %

## 2020-04-14 LAB
ALBUMIN SERPL ELPH-MCNC: 3.1 G/DL — LOW (ref 3.3–5)
ALP SERPL-CCNC: 59 U/L — SIGNIFICANT CHANGE UP (ref 40–120)
ALT FLD-CCNC: 80 U/L — HIGH (ref 4–41)
ANION GAP SERPL CALC-SCNC: 12 MMO/L — SIGNIFICANT CHANGE UP (ref 7–14)
AST SERPL-CCNC: 40 U/L — SIGNIFICANT CHANGE UP (ref 4–40)
BILIRUB SERPL-MCNC: 0.4 MG/DL — SIGNIFICANT CHANGE UP (ref 0.2–1.2)
BUN SERPL-MCNC: 12 MG/DL — SIGNIFICANT CHANGE UP (ref 7–23)
CALCIUM SERPL-MCNC: 8.8 MG/DL — SIGNIFICANT CHANGE UP (ref 8.4–10.5)
CHLORIDE SERPL-SCNC: 98 MMOL/L — SIGNIFICANT CHANGE UP (ref 98–107)
CK SERPL-CCNC: 130 U/L — SIGNIFICANT CHANGE UP (ref 30–200)
CO2 SERPL-SCNC: 25 MMOL/L — SIGNIFICANT CHANGE UP (ref 22–31)
CREAT SERPL-MCNC: 0.97 MG/DL — SIGNIFICANT CHANGE UP (ref 0.5–1.3)
CRP SERPL-MCNC: 5.1 MG/L — HIGH
D DIMER BLD IA.RAPID-MCNC: 2773 NG/ML — SIGNIFICANT CHANGE UP
FERRITIN SERPL-MCNC: 592.5 NG/ML — HIGH (ref 30–400)
GLUCOSE SERPL-MCNC: 94 MG/DL — SIGNIFICANT CHANGE UP (ref 70–99)
HCT VFR BLD CALC: 40.5 % — SIGNIFICANT CHANGE UP (ref 39–50)
HGB BLD-MCNC: 13.6 G/DL — SIGNIFICANT CHANGE UP (ref 13–17)
LDH SERPL L TO P-CCNC: 378 U/L — HIGH (ref 135–225)
MAGNESIUM SERPL-MCNC: 2.3 MG/DL — SIGNIFICANT CHANGE UP (ref 1.6–2.6)
MCHC RBC-ENTMCNC: 29.8 PG — SIGNIFICANT CHANGE UP (ref 27–34)
MCHC RBC-ENTMCNC: 33.6 % — SIGNIFICANT CHANGE UP (ref 32–36)
MCV RBC AUTO: 88.6 FL — SIGNIFICANT CHANGE UP (ref 80–100)
NRBC # FLD: 0 K/UL — SIGNIFICANT CHANGE UP (ref 0–0)
PHOSPHATE SERPL-MCNC: 3.3 MG/DL — SIGNIFICANT CHANGE UP (ref 2.5–4.5)
PLATELET # BLD AUTO: 502 K/UL — HIGH (ref 150–400)
PMV BLD: 8.4 FL — SIGNIFICANT CHANGE UP (ref 7–13)
POTASSIUM SERPL-MCNC: 3.9 MMOL/L — SIGNIFICANT CHANGE UP (ref 3.5–5.3)
POTASSIUM SERPL-SCNC: 3.9 MMOL/L — SIGNIFICANT CHANGE UP (ref 3.5–5.3)
PROCALCITONIN SERPL-MCNC: 0.06 NG/ML — SIGNIFICANT CHANGE UP (ref 0.02–0.1)
PROT SERPL-MCNC: 6.4 G/DL — SIGNIFICANT CHANGE UP (ref 6–8.3)
RBC # BLD: 4.57 M/UL — SIGNIFICANT CHANGE UP (ref 4.2–5.8)
RBC # FLD: 13.2 % — SIGNIFICANT CHANGE UP (ref 10.3–14.5)
SODIUM SERPL-SCNC: 135 MMOL/L — SIGNIFICANT CHANGE UP (ref 135–145)
WBC # BLD: 5.31 K/UL — SIGNIFICANT CHANGE UP (ref 3.8–10.5)
WBC # FLD AUTO: 5.31 K/UL — SIGNIFICANT CHANGE UP (ref 3.8–10.5)

## 2020-04-14 PROCEDURE — 99238 HOSP IP/OBS DSCHRG MGMT 30/<: CPT

## 2020-04-14 RX ADMIN — AMLODIPINE BESYLATE 10 MILLIGRAM(S): 2.5 TABLET ORAL at 06:36

## 2020-04-14 RX ADMIN — ENOXAPARIN SODIUM 40 MILLIGRAM(S): 100 INJECTION SUBCUTANEOUS at 06:37

## 2020-04-14 NOTE — DISCHARGE NOTE PROVIDER - NSDCCPCAREPLAN_GEN_ALL_CORE_FT
PRINCIPAL DISCHARGE DIAGNOSIS  Diagnosis: COVID-19  Assessment and Plan of Treatment: You presented with COVID-19 virus infection. You were treated with plaquenil, steroids and Anakirna. You were also provided oxygen delivery treatment.  It has been determined that you no longer need hospitalization and can recover while remaining in self-quarantine at home. You should follow the prevention steps below until a healthcare provider or local or state health department says you can return to your normal activities.  1. You should restrict activities outside your home, except for getting medical care.  2. Do not go to work, school, or public areas.  3. Avoid using public transportation, ride-sharing, or taxis.  4. Separate yourself from other people and animals in your home.  5. Call ahead before visiting your doctor.  6. Wear a facemask.  7. Cover your coughs and sneezes.  8. Clean your hands often.  9. Avoid sharing personal household items.  10. Clean all "high-touch" surfaces everyday.  11. Monitor your symptoms.  If you have a medical emergency and need to call 911, notify the dispatch personnel that you have COVID-19 If possible, put on a facemask before emergency medical services arrive.  12. Stopping home isolation.  Patients with confirmed COVID-19 should remain under home isolation precautions for 14 days since the positive COVID-19 test and until the risk of secondary transmission to others is thought to be low. The decision to discontinue home isolation precautions should be made on a case-by-case basis, in consultation with healthcare providers and state and local health departments. Your [facility] Department of Health can be reached at [phone] for further information about COVID-19.      SECONDARY DISCHARGE DIAGNOSES  Diagnosis: Hypertension  Assessment and Plan of Treatment: Please continue taking your amlodipine as prescribed.

## 2020-04-14 NOTE — PROGRESS NOTE ADULT - SUBJECTIVE AND OBJECTIVE BOX
Dr. Viraj Alcantar  Internal Medicine PGY-2  Pager# [phone]    Patient is a 44y old  Male who presents with a chief complaint of respiratory distress, hypoxia (13 Apr 2020 08:20)      SUBJECTIVE / OVERNIGHT EVENTS: There were no acute events overnight. Patient was HD stable. He is satting 95% on RA.     MEDICATIONS  (STANDING):  amLODIPine   Tablet 10 milliGRAM(s) Oral daily  enoxaparin Injectable 40 milliGRAM(s) SubCutaneous two times a day    MEDICATIONS  (PRN):  acetaminophen   Tablet .. 650 milliGRAM(s) Oral every 4 hours PRN Temp greater or equal to 38.5C (101.3F)  acetaminophen  Suppository .. 650 milliGRAM(s) Rectal every 4 hours PRN Temp greater or equal to 38.5C (101.3F)  ALBUTerol    90 MICROgram(s) HFA Inhaler 2 Puff(s) Inhalation every 6 hours PRN Shortness of Breath  aluminum hydroxide/magnesium hydroxide/simethicone Suspension 30 milliLiter(s) Oral every 6 hours PRN Dyspepsia      Vital Signs Last 24 Hrs  T(C): 36.9 (14 Apr 2020 06:33), Max: 37.1 (13 Apr 2020 22:03)  T(F): 98.5 (14 Apr 2020 06:33), Max: 98.8 (13 Apr 2020 22:03)  HR: 84 (14 Apr 2020 06:33) (84 - 90)  BP: 130/88 (14 Apr 2020 06:33) (120/83 - 133/88)  BP(mean): --  RR: 18 (14 Apr 2020 06:33) (18 - 18)  SpO2: 95% (14 Apr 2020 06:33) (95% - 99%)  CAPILLARY BLOOD GLUCOSE        I&O's Summary    13 Apr 2020 07:01  -  14 Apr 2020 07:00  --------------------------------------------------------  IN: 0 mL / OUT: 780 mL / NET: -780 mL        PHYSICAL EXAM:  GENERAL: NAD, well-developed  HEAD:  Atraumatic, Normocephalic  EYES: EOMI, PERRLA, conjunctiva and sclera clear  NECK: Supple, No JVD  CHEST/LUNG: Clear to auscultation bilaterally; No wheeze  HEART: Regular rate and rhythm; No murmurs, rubs, or gallops  ABDOMEN: Soft, Nontender, Nondistended; Bowel sounds present  EXTREMITIES:  2+ Peripheral Pulses, No clubbing, cyanosis, or edema  PSYCH: AAOx3  NEUROLOGY: non-focal  SKIN: No rashes or lesions    LABS:                        13.6   5.31  )-----------( 502      ( 14 Apr 2020 05:10 )             40.5     04-14    135  |  98  |  12  ----------------------------<  94  3.9   |  25  |  0.97    Ca    8.8      14 Apr 2020 05:10  Phos  3.3     04-14  Mg     2.3     04-14    TPro  6.4  /  Alb  3.1<L>  /  TBili  0.4  /  DBili  x   /  AST  40  /  ALT  80<H>  /  AlkPhos  59  04-14      CARDIAC MARKERS ( 14 Apr 2020 05:10 )  x     / x     / 130 u/L / x     / x              RADIOLOGY & ADDITIONAL TESTS:    Imaging Personally Reviewed:    Consultant(s) Notes Reviewed:      Care Discussed with Consultants/Other Providers: Dr. Viraj Alcantar  Internal Medicine PGY-2  Pager# [phone]    Patient is a 44y old  Male who presents with a chief complaint of respiratory distress, hypoxia (13 Apr 2020 08:20)  Denies any sob, chest pain, fever, chills, dizziness, nausea or diarrhea.    SUBJECTIVE / OVERNIGHT EVENTS: There were no acute events overnight. Patient was HD stable. He is satting 95% on RA.     MEDICATIONS  (STANDING):  amLODIPine   Tablet 10 milliGRAM(s) Oral daily  enoxaparin Injectable 40 milliGRAM(s) SubCutaneous two times a day    MEDICATIONS  (PRN):  acetaminophen   Tablet .. 650 milliGRAM(s) Oral every 4 hours PRN Temp greater or equal to 38.5C (101.3F)  acetaminophen  Suppository .. 650 milliGRAM(s) Rectal every 4 hours PRN Temp greater or equal to 38.5C (101.3F)  ALBUTerol    90 MICROgram(s) HFA Inhaler 2 Puff(s) Inhalation every 6 hours PRN Shortness of Breath  aluminum hydroxide/magnesium hydroxide/simethicone Suspension 30 milliLiter(s) Oral every 6 hours PRN Dyspepsia      Vital Signs Last 24 Hrs  T(C): 36.9 (14 Apr 2020 06:33), Max: 37.1 (13 Apr 2020 22:03)  T(F): 98.5 (14 Apr 2020 06:33), Max: 98.8 (13 Apr 2020 22:03)  HR: 84 (14 Apr 2020 06:33) (84 - 90)  BP: 130/88 (14 Apr 2020 06:33) (120/83 - 133/88)  BP(mean): --  RR: 18 (14 Apr 2020 06:33) (18 - 18)  SpO2: 95% (14 Apr 2020 06:33) (95% - 99%)  CAPILLARY BLOOD GLUCOSE        I&O's Summary    13 Apr 2020 07:01  -  14 Apr 2020 07:00  --------------------------------------------------------  IN: 0 mL / OUT: 780 mL / NET: -780 mL        PHYSICAL EXAM:  GENERAL: NAD, well-developed  HEAD:  Atraumatic, Normocephalic  EYES: EOMI, PERRLA, conjunctiva and sclera clear  NECK: Supple, No JVD  CHEST/LUNG: Clear to auscultation bilaterally; No wheeze  HEART: Regular rate and rhythm; No murmurs, rubs, or gallops  ABDOMEN: Soft, Nontender, Nondistended; Bowel sounds present  EXTREMITIES:  2+ Peripheral Pulses, No clubbing, cyanosis, or edema  PSYCH: AAOx3  NEUROLOGY: non-focal  SKIN: No rashes or lesions    LABS:                        13.6   5.31  )-----------( 502      ( 14 Apr 2020 05:10 )             40.5     04-14    135  |  98  |  12  ----------------------------<  94  3.9   |  25  |  0.97    Ca    8.8      14 Apr 2020 05:10  Phos  3.3     04-14  Mg     2.3     04-14    TPro  6.4  /  Alb  3.1<L>  /  TBili  0.4  /  DBili  x   /  AST  40  /  ALT  80<H>  /  AlkPhos  59  04-14      CARDIAC MARKERS ( 14 Apr 2020 05:10 )  x     / x     / 130 u/L / x     / x              RADIOLOGY & ADDITIONAL TESTS:    Imaging Personally Reviewed:    Consultant(s) Notes Reviewed:      Care Discussed with Consultants/Other Providers:

## 2020-04-14 NOTE — DISCHARGE NOTE PROVIDER - HOSPITAL COURSE
44M PMH HTN (on Amlodipine) p/w weakness, intermittent fevers, night sweats since last Wednesday. States his weakness got worse last night and felt shortness of breath this morning and went to  and was told based on his oxygen saturation to come to the ED. Reports night sweats and fevers getting better in the last few days but his weakness persisting. Denies any headaches, cough, chest pain, abd pain, n/v/d, urinary sxs, leg swelling. Denies recent surgeries, hx blood clots. States he works at [employer]. Was given tylenol an hour prior to arrival at . He was treated with plaquenil, steroids and Anakirna. Patient is medically optimized for discharge to home and will need outpatient follow up with his primary care provider. 44M PMH HTN (on Amlodipine) p/w weakness, intermittent fevers, night sweats since last Wednesday. States his weakness got worse last night and felt shortness of breath this morning and went to  and was told based on his oxygen saturation to come to the ED. Reports night sweats and fevers getting better in the last few days but his weakness persisting. Denies any headaches, cough, chest pain, abd pain, n/v/d, urinary sxs, leg swelling. Denies recent surgeries, hx blood clots. States he works at [employer]. Was given tylenol an hour prior to arrival at . He was treated with plaquenil, steroids and Anakirna. Patient was ambulating 92% while on room air. Patient is medically optimized for discharge to home and will need outpatient follow up with his primary care provider.

## 2020-04-14 NOTE — PROGRESS NOTE ADULT - ASSESSMENT
44M w/ HTN p/w 1 week of fevers and weakness, found to be hypoxic in UC. COVID-19 +.    COVID-19 Test: +4/6  Prognostic labs: Ferritin 510; CRP 70  QTC: 429  S/p Hydroxychloroquine: started 4/6, Steroids: started 4/6 and Anakinra: started 4-8  F/u inflammatory markers in AM    Problem/Plan:  #Hypoxic respiratory failure 2/2 COVID-19 Infection  - steroids ended 4/9 and s/p Anakinra  - Tylenol PRN fever  - albuterol PRN SOB    #Chronic Medical Problems:  - HTN: c/w amlodipine    #FEN/PPx  - lovenox SubQ BID  - regular diet    #FULL CODE 44M w/ HTN p/w 1 week of fevers and weakness, found to be hypoxic in UC. COVID-19 +.    COVID-19 Test: +4/6  S/p Hydroxychloroquine: started 4/6, Steroids: started 4/6 and Anakinra: started 4-8  Inflammatory markers improving    Problem/Plan:  #Hypoxic respiratory failure 2/2 COVID-19 Infection  - pt improved. Will need have ambulatory test without oxygen supplementation   - steroids ended 4/9 and s/p Anakinra  - albuterol PRN SOB    #Chronic Medical Problems:  - HTN: c/w amlodipine    #FEN/PPx  - lovenox SubQ BID  - regular diet    #FULL CODE 44M w/ HTN p/w 1 week of fevers and weakness, found to be hypoxic in UC. COVID-19 +.    COVID-19 Test: +4/6  S/p Hydroxychloroquine: started 4/6, Steroids: started 4/6 and Anakinra: started 4-8  Inflammatory markers improving    Problem/Plan:  #Hypoxic respiratory failure 2/2 COVID-19 Infection  - pt improved. Will need to ambulate pt w/o oxygen supplementation and record saturation  - steroids ended 4/9 and s/p Anakinra  - albuterol PRN SOB    #Chronic Medical Problems:  - HTN: c/w amlodipine    #FEN/PPx  - lovenox SubQ BID  - regular diet    #FULL CODE 44M w/ HTN p/w 1 week of fevers and weakness, found to be hypoxic in UC. COVID-19 +. Dispo pending ability ambulate on RA.     COVID-19 Test: +4/6  S/p Hydroxychloroquine: started 4/6, Steroids: started 4/6 and Anakinra: started 4-8  Inflammatory markers improving    Problem/Plan:  #Hypoxic respiratory failure 2/2 COVID-19 Infection  - pt improved. Will need to ambulate pt w/o oxygen supplementation and record saturation  - steroids ended 4/9 and s/p Anakinra  - albuterol PRN SOB    #Chronic Medical Problems:  - HTN: c/w amlodipine    #FEN/PPx  - lovenox SubQ BID  - regular diet    #FULL CODE

## 2020-04-14 NOTE — DISCHARGE NOTE NURSING/CASE MANAGEMENT/SOCIAL WORK - PATIENT PORTAL LINK FT
You can access the FollowMyHealth Patient Portal offered by [facility] by registering at the following website: [email or web address]. By joining [employer]’s FollowMyHealth portal, you will also be able to view your health information using other applications (apps) compatible with our system.

## 2020-04-14 NOTE — DISCHARGE NOTE PROVIDER - NSFOLLOWUPCLINICS_GEN_ALL_ED_FT
[facility] General Internal Medicine  General Internal Medicine  [address] [id number]  Phone: [phone]  Fax:   Follow Up Time:

## 2020-04-14 NOTE — PROGRESS NOTE ADULT - REASON FOR ADMISSION
respiratory distress, hypoxia

## 2021-07-23 ENCOUNTER — EMERGENCY (EMERGENCY)
Facility: HOSPITAL | Age: 46
LOS: 1 days | Discharge: HOME | End: 2021-07-23
Attending: EMERGENCY MEDICINE | Admitting: EMERGENCY MEDICINE
Payer: COMMERCIAL

## 2021-07-23 VITALS
DIASTOLIC BLOOD PRESSURE: 85 MMHG | RESPIRATION RATE: 16 BRPM | SYSTOLIC BLOOD PRESSURE: 132 MMHG | TEMPERATURE: 98 F | OXYGEN SATURATION: 99 % | HEART RATE: 72 BPM | HEIGHT: 71 IN

## 2021-07-23 VITALS
DIASTOLIC BLOOD PRESSURE: 85 MMHG | RESPIRATION RATE: 16 BRPM | OXYGEN SATURATION: 100 % | TEMPERATURE: 99 F | HEART RATE: 63 BPM | SYSTOLIC BLOOD PRESSURE: 136 MMHG

## 2021-07-23 PROCEDURE — 99284 EMERGENCY DEPT VISIT MOD MDM: CPT

## 2021-07-23 PROCEDURE — 71046 X-RAY EXAM CHEST 2 VIEWS: CPT | Mod: 26

## 2021-07-23 PROCEDURE — 70490 CT SOFT TISSUE NECK W/O DYE: CPT | Mod: 26

## 2021-07-23 RX ADMIN — Medication 1 TABLET(S): at 21:59

## 2021-07-23 NOTE — ED PROVIDER NOTE - CLINICAL SUMMARY MEDICAL DECISION MAKING FREE TEXT BOX
Pt c non contrib PMHx who p/w worsening neck pain after he felt a fish bone get stuck 2 weeks ago.  Feels it hasn't passed and now pain is radiating to left ear with swallowing. No AW issues, no voice changes, and no fevers.  Exam grossly unremarkable except for point tenderness approximately at height of larynx. Will image and reassess. Alejandro Hobbs is a 45 year old male w/ no pertinent PMHx reporting to ED w/ dysphagia 2/2  fish bone get stuck 2 weeks ago with no having worsening L neck pain.  Feels it hasn't passed and now pain is radiating to left ear with swallowing. No AW issues, no voice changes, and no fevers.  Exam grossly unremarkable except for point tenderness approximately at height of larynx. VS reassuring. Patient phonating appropriately, no airway compromise.  reassuring, no foreign body present. Given his discussion surrounding L neck and ear pain, concerned about micropuncture of wall and migration. Will obtain CT. This patient was signed out during routine sign out to my colleague, REBEKA Arthur; please see her note for more information regarding this patient's final disposition. Patient stable at time of shift change. Alejandro Hobbs is a 45 year old male w/ no pertinent PMHx reporting to ED w/ dysphagia 2/2  fish bone get stuck 2 weeks ago with no having worsening L neck pain.  Patient staffed with supervising physician, Dr. Arriaza. Medical record reviewed. Patient feels it hasn't passed and now pain is radiating to left ear with swallowing. No AW issues, no voice changes, and no fevers.  Exam grossly unremarkable except for point tenderness approximately at height of larynx. VS reassuring. Patient phonating appropriately, no airway compromise.  reassuring, no foreign body present. Given his discussion surrounding L neck and ear pain, concerned about micropuncture of wall and migration. Will obtain CT. This patient was signed out during routine sign out to my colleague, REBEKA Arthur; please see her note for more information regarding this patient's final disposition. Patient stable at time of shift change.

## 2021-07-23 NOTE — ED ADULT TRIAGE NOTE - CHIEF COMPLAINT QUOTE
Pt c/o fishbone in throat for 2 weeks and now has pain in left ear. Resp even and unlabored, speaking in full sentences. No drooling noted. Sent from urgent care for eval. H/o HTN.

## 2021-07-23 NOTE — ED ADULT NURSE NOTE - OBJECTIVE STATEMENT
Receive pt. in Intake room 13 alert and oriented x 4, presenting to the ER with complaints of throat. Pt. stated " I ate fish 2 weeks ago and I have a fish bone stuck in the left side of my throat and I feel pain when I swallow". No drooling, no respiratory distress.

## 2021-07-23 NOTE — ED ADULT NURSE NOTE - NS ED NURSE IV DC DT
Nasal mucosa clear.  Throat has no vesicles, no oropharyngeal exudates and uvula is midline. +dry mucous membranes
23-Jul-2021 23:01

## 2021-07-23 NOTE — ED PROVIDER NOTE - PROGRESS NOTE DETAILS
Sams: patient appears well, no fb on ct scan, states L ear has been painful x3 days, worse yesterday, L TM bulging on exam, will start augmentin, strict return precautions provided, patient requesting pcp, cards f/u, will also provide ent f/u Sams: patient scoped by ENT, no fb, will dc

## 2021-07-23 NOTE — ED PROVIDER NOTE - PATIENT PORTAL LINK FT
You can access the FollowMyHealth Patient Portal offered by [facility] by registering at the following website: [email or web address]. By joining [employer]’s FollowMyHealth portal, you will also be able to view your health information using other applications (apps) compatible with our system. You can access the FollowMyHealth Patient Portal offered by [facility] by registering at the following website: [email or web address]. By joining [employer]’s FollowMyHealth portal, you will also be able to view your health information using other applications (apps) compatible with our system.

## 2021-07-23 NOTE — ED PROVIDER NOTE - NSFOLLOWUPCLINICS_GEN_ALL_ED_FT
[facility] - ENT  Otolaryngology (ENT)  [address] [place] [address]  Phone: [phone]  Fax:   Follow Up Time: 1-3 Days    [facility]  Cardiology  [address] [place] [address]  Phone: [phone]  Fax:   Follow Up Time: 1-3 Days    [facility] General Internal Medicine  General Internal Medicine  [address] [place] [address]  Phone: [phone]  Fax:   Follow Up Time: 1-3 Days    [facility] [place]  Internal Medicine  [address] [place] [address]  Phone: [phone]  Fax: [phone]  Follow Up Time: 1-3 Days     [facility] - ENT  Otolaryngology (ENT)  [address] [place] [address]  Phone: [phone]  Fax:   Follow Up Time: 1-3 Days    [facility]  Cardiology  [address] [place] [address]  Phone: [phone]  Fax:   Follow Up Time: 1-3 Days    [facility] General Internal Medicine  General Internal Medicine  [address] [place] [id number]  Phone: [phone]  Fax:   Follow Up Time: 1-3 Days    [facility] [place]  Internal Medicine  [address] [place] [address]  Phone: [phone]  Fax: [phone]  Follow Up Time: 1-3 Days

## 2021-07-23 NOTE — ED PROVIDER NOTE - NSFOLLOWUPCLINICSTOKEN_GEN_ALL_ED_FT
700454:1-3 Days|| ||00\01||False;893782:1-3 Days|| ||00\01||False;[id number]:1-3 Days|| ||00\01||False;262124:1-3 Days|| ||00\01||False;

## 2021-07-23 NOTE — ED PROVIDER NOTE - OBJECTIVE STATEMENT
44yo M w/ PMHx of HTN on amlodipine reporting to the ED with throat pain. Patient reports two weeks ago he was eating a fish bone when he felt the bone was impacted in his esophagus. He reports being able to eat and drink but today was having some ear pain which prompted him being evaluated in the urgent care setting and then was transferred to the ED. He report dysphagia but denies any odynophagia. He denies any chest pain or shortness of breath. He denies any fevers. 46yo M w/ PMHx of HTN on amlodipine reporting to the ED with throat pain. Patient reports two weeks ago he was eating a fish bone when he felt the bone was impacted in his throat. He reports being able to eat and drink but today was having some ear pain which prompted him being evaluated in the urgent care setting and then was transferred to the ED.  He denies any chest pain or shortness of breath. He denies any fevers.  Pain has been getting worse over last few days (left mid neck to left ear), particularly while swallowing.

## 2021-07-23 NOTE — ED ADULT NURSE REASSESSMENT NOTE - NS ED NURSE REASSESS COMMENT FT1
Pt awake, alert and oriented x 4 c/o mild discomfort to throat and neck worsening pain with swallowing.   Able to handle secretions, voice clear, no  SOB.   Awaiting CT results.

## 2021-07-23 NOTE — ED PROVIDER NOTE - NSFOLLOWUPINSTRUCTIONS_ED_ALL_ED_FT
- Follow up with your primary care doctor in 1-2 days.    - Bring results with you to the appointment.   - Take tylenol 650mg or motrin 600mg every 6 hours for pain or fever.   - Return to the ED for new or worsening symptoms including but not limited to worsening pain, difficulty swallowing, voice changes, nausea, vomiting, difficulty breathing, drooling, fevers, chills, shortness of breath, chest pain

## 2021-07-23 NOTE — ED PROVIDER NOTE - ENMT, MLM
Airway patent, Nasal mucosa clear. Mouth with normal mucosa. Throat has no vesicles, no oropharyngeal exudates and uvula is midline. Airway patent, Nasal mucosa clear. Mouth with normal mucosa. Throat has no vesicles, no oropharyngeal exudates and uvula is midline.  Tender to left of trachea, mid neck.

## 2021-07-23 NOTE — ED PROVIDER NOTE - ATTENDING CONTRIBUTION TO CARE
Attending Attestation: Dr. Arriaza  I have personally performed a history and physical examination of the patient and discussed management with the resident as well as the patient.  I reviewed the resident's note and agree with the documented findings and plan of care.  I have authored and modified critical sections of the Provider Note, including but not limited to HPI, Physical Exam and MDM. Pt c non contrib PMHx who p/w worsening neck pain after he felt a fish bone get stuck 2 weeks ago.  Feels it hasn't passed and now pain is radiating to left ear with swallowing. No AW issues, no voice changes, and no fevers.  Exam grossly unremarkable except for point tenderness approximately at height of larynx. Will image and reassess.

## 2021-07-24 NOTE — CONSULT NOTE ADULT - ASSESSMENT
Assessment:  The patient is a 45 year old male with a past medical history significant for HTN on amlodipine who reports to the emergency room at [facility] with a chief complaint of throat pain and difficulty swallowing for the past several hours.  Ddx:  GERD, versus aerodigestive foreign body versus most probably post traumatic pharyngeal wall abrasion. Airway is widely patent, no evidence of foreign body.    Plan:  1) cat scan of neck without contrast  2) diet as tolerated

## 2021-07-24 NOTE — CONSULT NOTE ADULT - COMMENTS
Vital Signs:  · BP Systolic	132 mm Hg  · BP Diastolic	85 mm Hg  · Heart Rate	72 /min  · Respiration Rate (breaths/min)	16 /min  · Temp (F)	98.1 Degrees F  · Temp (C)	36.7 Degrees C  · Temp site	oral  · SpO2 (%)	99 %  · O2 Delivery/Oxygen Delivery Method	room air

## 2021-07-24 NOTE — CONSULT NOTE ADULT - ENMT COMMENTS
Flexible Fiberoptic Laryngoscopy: clear nasopharynx to glottis, tvc mobile b/l, +left pharyngeal wall abrasion, +GERD, no e/o foreign body

## 2021-07-24 NOTE — CONSULT NOTE ADULT - NOSE
nasal/sinus endoscopy: maxillary sinus clear b/l via inferior meatus, ss clear b/l/inflamed mucosa/congestion

## 2021-07-24 NOTE — CONSULT NOTE ADULT - SUBJECTIVE AND OBJECTIVE BOX
HPI: The patient is a 45 year old male with a past medical history significant for HTN on amlodipine who reports to the emergency room at [facility] with a chief complaint of throat pain and difficulty swallowing for the past several hours. Patient reports two weeks ago he was eating a fish bone when he felt the bone was impacted in his throat. He reports being able to eat and drink but today was having some ear pain which prompted him being evaluated in the urgent care setting and then was transferred to the ED.  He denies any chest pain or shortness of breath. He denies any fevers.  Pain has been getting worse over last few days (left mid neck to left ear), particularly while swallowing.    HIV:    HIV Test Questions:  · In accordance with [place] State law, we offer every patient who comes to our ED an HIV test. Would you like to be tested today?	Opt out    PAST MEDICAL/SURGICAL/FAMILY/SOCIAL HISTORY:    Past Medical History:  Essential hypertension.     Past Surgical History:  No significant past surgical history.     Tobacco Usage:  · Tobacco Usage	Unknown if ever smoked    ALLERGIES AND HOME MEDICATIONS:   Allergies:        Allergies:  	No Known Allergies:     Home Medications:   * Patient Currently Takes Medications as of 14-Apr-2020 15:09 documented in Structured Notes  · 	amLODIPine 10 mg oral tablet: 1 tab(s) orally once a day

## 2021-07-25 NOTE — CHART NOTE - NSCHARTNOTEFT_GEN_A_CORE
[facility]  Head & Neck Surgery Procedure Note      Name:                  Alejandro Felipe	               Surgeon:   Torres Silveira MD	  				  Date of Procedure: 07/23/2021                           Assistant:  None    Record Number:	[id number]                            Anesthesia:  Topical Anesthesia       Preoperative diagnosis:	Dysphagia, unspecified (R13.10)  	  Postoperative diagnosis:	Dysphagia, unspecified (R13.10)    Procedure:		Flexible Fiberoptic Laryngoscopy  (39122)    **Reason for Laryngoscopy:  Patient unable to cooperate with indirect mirror laryngoscopy.**    INDICATION:  The patient is a 45 year old male with a past medical history significant for HTN on amlodipine who reports to the emergency room at [facility] with a chief complaint of throat pain and difficulty swallowing for the past several hours. Patient reports two weeks ago he was eating a fish bone when he felt the bone was impacted in his throat. He reports being able to eat and drink but today was having some ear pain which prompted him being evaluated in the urgent care setting and then was transferred to the ED.  He denies any chest pain or shortness of breath. He denies any fevers.  Pain has been getting worse over last few days (left mid neck to left ear), particularly while swallowing.    PROCEDURE: The patient was seen and his bilateral nasal cavities were prepped and sprayed with topical anesthesia of neosynephrine.   Following this, a fiberoptic flexible laryngoscope was passed into the left nasal cavity, and then slowly and meticulously moved posteriorly to the nasopharynx.  There was no evidence of clotted blood within the left anterior and posterior superior lateral nasal wall. There was clear mucous within the nasal cavity.  Once at nasopharynx the skull base and lateral walls of the eustachian tubes were examined for lesions and masses.  There was no blood or masses within the nasopharynx. There was mild prominence of the nasopharyngeal soft tissue consistent with inflammatory reaction.  The fiberoptic endoscope was then carefully directed inferiorly and moved to the hypopharynx and glottis.  There was no fullness of the base of tongue.  There was 1-2+ prominence of the tonsils bilaterally (equally) and without exudate. There was no evidence of retropharyngeal erythema or edema.  There was an abrasion on the left pharyngeal wall.  There was mild  diffuse erythema  of the pharyngeal wall and supraglottic structure consistent with GERD.  The true vocal cords appeared to be mobile bilaterally.  There was no evidence of foreign body or pooling of secretions, or obvious aspiration or penetration of liquid into the glottis.  The airway was widely patent. The patient tolerated the procedure well.. [facility]  Head & Neck Surgery Procedure Note      Name:                  Alejandro Felipe	               Surgeon:   Torres Silveira MD	  				  Date of Procedure: 07/23/2021                           Assistant:  None    Record Number:	[id number]                            Anesthesia:  Topical Anesthesia       Preoperative diagnosis:	Dysphagia, unspecified (R13.10)  	  Postoperative diagnosis:	Dysphagia, unspecified (R13.10)    Procedure:		Flexible Fiberoptic Laryngoscopy  (69338)    **Reason for Fiberoptic Flexible Laryngoscopy:  Patient unable to cooperate with indirect mirror laryngoscopy.**    INDICATION:  The patient is a 45 year old male with a past medical history significant for HTN on amlodipine who reports to the emergency room at [facility] with a chief complaint of throat pain and difficulty swallowing for the past several hours. Patient reports two weeks ago he was eating a fish bone when he felt the bone was impacted in his throat. He reports being able to eat and drink but today was having some ear pain which prompted him being evaluated in the urgent care setting and then was transferred to the ED.  He denies any chest pain or shortness of breath. He denies any fevers.  Pain has been getting worse over last few days (left mid neck to left ear), particularly while swallowing.    PROCEDURE: The patient was seen and his bilateral nasal cavities were prepped and sprayed with topical anesthesia of neosynephrine.   Following this, a fiberoptic flexible laryngoscope was passed into the left nasal cavity, and then slowly and meticulously moved posteriorly to the nasopharynx.  There was no evidence of clotted blood within the left anterior and posterior superior lateral nasal wall. There was clear mucous within the nasal cavity.  Once at nasopharynx the skull base and lateral walls of the eustachian tubes were examined for lesions and masses.  There was no blood or masses within the nasopharynx. There was mild prominence of the nasopharyngeal soft tissue consistent with inflammatory reaction.  The fiberoptic endoscope was then carefully directed inferiorly and moved to the hypopharynx and glottis.  There was no fullness of the base of tongue.  There was 1-2+ prominence of the tonsils bilaterally (equally) and without exudate. There was no evidence of retropharyngeal erythema or edema.  There was an abrasion on the left pharyngeal wall.  There was mild  diffuse erythema  of the pharyngeal wall and supraglottic structure consistent with GERD.  The true vocal cords appeared to be mobile bilaterally.  There was no evidence of foreign body or pooling of secretions, or obvious aspiration or penetration of liquid into the glottis.  The airway was widely patent. The patient tolerated the procedure well..

## 2021-10-08 PROBLEM — I10 ESSENTIAL (PRIMARY) HYPERTENSION: Chronic | Status: ACTIVE | Noted: 2021-07-23

## 2021-10-11 PROBLEM — Z00.00 ENCOUNTER FOR PREVENTIVE HEALTH EXAMINATION: Status: ACTIVE | Noted: 2021-10-11

## 2021-10-14 ENCOUNTER — NON-APPOINTMENT (OUTPATIENT)
Age: 46
End: 2021-10-14

## 2021-10-14 ENCOUNTER — APPOINTMENT (OUTPATIENT)
Dept: CARDIOLOGY | Facility: CLINIC | Age: 46
End: 2021-10-14
Payer: COMMERCIAL

## 2021-10-14 VITALS
BODY MASS INDEX: 32.06 KG/M2 | OXYGEN SATURATION: 98 % | HEART RATE: 103 BPM | WEIGHT: 229 LBS | SYSTOLIC BLOOD PRESSURE: 150 MMHG | HEIGHT: 71 IN | DIASTOLIC BLOOD PRESSURE: 100 MMHG

## 2021-10-14 VITALS — DIASTOLIC BLOOD PRESSURE: 82 MMHG | SYSTOLIC BLOOD PRESSURE: 156 MMHG

## 2021-10-14 DIAGNOSIS — I10 ESSENTIAL (PRIMARY) HYPERTENSION: ICD-10-CM

## 2021-10-14 DIAGNOSIS — Z82.49 FAMILY HISTORY OF ISCHEMIC HEART DISEASE AND OTHER DISEASES OF THE CIRCULATORY SYSTEM: ICD-10-CM

## 2021-10-14 DIAGNOSIS — Z78.9 OTHER SPECIFIED HEALTH STATUS: ICD-10-CM

## 2021-10-14 DIAGNOSIS — E66.9 OBESITY, UNSPECIFIED: ICD-10-CM

## 2021-10-14 DIAGNOSIS — R97.20 ELEVATED PROSTATE, SPECIFIC ANTIGEN [PSA]: ICD-10-CM

## 2021-10-14 PROCEDURE — 93000 ELECTROCARDIOGRAM COMPLETE: CPT

## 2021-10-14 PROCEDURE — 99214 OFFICE O/P EST MOD 30 MIN: CPT

## 2021-10-14 RX ORDER — LOSARTAN POTASSIUM AND HYDROCHLOROTHIAZIDE 12.5; 5 MG/1; MG/1
50-12.5 TABLET ORAL
Refills: 0 | Status: ACTIVE | COMMUNITY

## 2021-10-14 RX ORDER — LOSARTAN POTASSIUM AND HYDROCHLOROTHIAZIDE 25; 100 MG/1; MG/1
100-25 TABLET ORAL DAILY
Qty: 90 | Refills: 3 | Status: ACTIVE | COMMUNITY
Start: 2021-10-14 | End: 1900-01-01

## 2021-10-14 RX ORDER — AMLODIPINE BESYLATE 10 MG/1
10 TABLET ORAL
Refills: 0 | Status: DISCONTINUED | COMMUNITY
End: 2021-10-14

## 2021-10-14 NOTE — HISTORY OF PRESENT ILLNESS
[FreeTextEntry1] : PATRICE MIKE is a 46 year old male here for follow up of hypertension.\par He is not checking BP at home but plans to.  Gained weight.\par Feels ok.\par Had elev PSA and is undergoing work up with Urol.\par

## 2021-10-14 NOTE — DISCUSSION/SUMMARY
[FreeTextEntry1] : Advised inc losartan-hct to 100-25 mg QD.\par Contin amlodipine (had swelling with higher dose)\par Advised diet, exerc and wt loss\par Follow up 6-8 weeks BP check\par Referred to primary care\par

## 2021-10-15 LAB
ALBUMIN SERPL ELPH-MCNC: 5 G/DL
ALP BLD-CCNC: 99 U/L
ALT SERPL-CCNC: 27 U/L
ANION GAP SERPL CALC-SCNC: 13 MMOL/L
AST SERPL-CCNC: 27 U/L
BILIRUB SERPL-MCNC: 0.3 MG/DL
BUN SERPL-MCNC: 10 MG/DL
CALCIUM SERPL-MCNC: 9.9 MG/DL
CHLORIDE SERPL-SCNC: 98 MMOL/L
CHOLEST SERPL-MCNC: 211 MG/DL
CO2 SERPL-SCNC: 28 MMOL/L
CREAT SERPL-MCNC: 1.31 MG/DL
ESTIMATED AVERAGE GLUCOSE: 114 MG/DL
GLUCOSE SERPL-MCNC: 91 MG/DL
HBA1C MFR BLD HPLC: 5.6 %
HDLC SERPL-MCNC: 51 MG/DL
LDLC SERPL CALC-MCNC: 132 MG/DL
NONHDLC SERPL-MCNC: 160 MG/DL
POTASSIUM SERPL-SCNC: 3.6 MMOL/L
PROT SERPL-MCNC: 7.8 G/DL
SODIUM SERPL-SCNC: 139 MMOL/L
TRIGL SERPL-MCNC: 139 MG/DL
TSH SERPL-ACNC: 2.22 UIU/ML

## 2022-06-30 ENCOUNTER — RX RENEWAL (OUTPATIENT)
Age: 47
End: 2022-06-30

## 2022-06-30 RX ORDER — AMLODIPINE BESYLATE 5 MG/1
5 TABLET ORAL
Qty: 90 | Refills: 0 | Status: ACTIVE | COMMUNITY
Start: 2022-06-30 | End: 1900-01-01

## 2023-10-17 NOTE — ED PROVIDER NOTE - HEME LYMPH, MLM
no weakness/no paresthesias/no generalized seizures/no focal seizures/no syncope/no tremors/no vertigo
No adenopathy or splenomegaly. No cervical or inguinal lymphadenopathy.